# Patient Record
Sex: FEMALE | Race: WHITE | HISPANIC OR LATINO | Employment: FULL TIME | URBAN - METROPOLITAN AREA
[De-identification: names, ages, dates, MRNs, and addresses within clinical notes are randomized per-mention and may not be internally consistent; named-entity substitution may affect disease eponyms.]

---

## 2018-07-23 ENCOUNTER — OFFICE VISIT (OUTPATIENT)
Dept: URGENT CARE | Facility: CLINIC | Age: 33
End: 2018-07-23
Payer: COMMERCIAL

## 2018-07-23 VITALS
WEIGHT: 250 LBS | SYSTOLIC BLOOD PRESSURE: 112 MMHG | RESPIRATION RATE: 16 BRPM | TEMPERATURE: 97.9 F | BODY MASS INDEX: 42.68 KG/M2 | DIASTOLIC BLOOD PRESSURE: 80 MMHG | HEART RATE: 94 BPM | HEIGHT: 64 IN | OXYGEN SATURATION: 97 %

## 2018-07-23 DIAGNOSIS — G44.209 TENSION HEADACHE: Primary | ICD-10-CM

## 2018-07-23 PROCEDURE — 99213 OFFICE O/P EST LOW 20 MIN: CPT | Performed by: PHYSICIAN ASSISTANT

## 2018-07-23 NOTE — PATIENT INSTRUCTIONS
Acute Headache   AMBULATORY CARE:   An acute headache  is pain or discomfort that starts suddenly and gets worse quickly  You may have an acute headache only when you feel stress or eat certain foods  Other acute headache pain can happen every day, and sometimes several times a day  The cause of an acute headache may not be known  It may be triggered by stress, fatigue, hormones, food, or trauma  Common types of acute headache:   · Tension headache  is the most common type of headache  These headaches typically occur in the late afternoon and go away by evening  The pain is usually mild or moderate  You may have problems tolerating bright light or loud noise  The pain is usually across the forehead or in the back of the head, often only on one side  These headaches may occur every day  · Migraine headaches  cause moderate or severe pain  The headache generally lasts from 1 to 3 days and tends to come back  Pain is usually on only one side, but it may change sides  Migraines often occur in the temple, the back of the head, or behind the eye  The pain may throb or be sharp and steady  · A migraine with aura  means you see or feel something before a migraine  You may see a small spot surrounded by bright zigzag lines  Other signs or symptoms may follow the aura  · Cluster headache  pain is usually only on one side  It often causes severe pain, and can last for 30 minutes to 2 hours  These headaches may occur 1 or 2 times each day, more often at night  The pain may wake you  Seek care immediately if:   · You have severe pain  · You have numbness or weakness on one side of your face or body  · You have a headache that occurs after a blow to the head, a fall, or other trauma  · You have a headache, are forgetful or confused, or have trouble speaking  · You have a headache, stiff neck, and a fever  Contact your healthcare provider if:   · You have a constant headache and are vomiting      · You have a headache each day that does not get better, even after treatment  · You have changes in your headaches, or new symptoms that occur when you have a headache  · You have questions or concerns about your condition or care  Treatment:   · Medicine  may be given to decrease pain  The medicine your healthcare provider recommends will depend on the kind of headaches you have  You will need to take prescription headache medicines as directed to prevent a problem called rebound headache  These headaches happen with regular use of pain relievers for headache disorders  NSAIDs or acetaminophen may help some kinds of headaches  · Biofeedback  may help you learn how to change stress reactions  For example, you learn to slow your heart rate when you become upset  You may also learn to prevent certain headaches by combining heat with relaxation  · Cognitive behavior therapy,  or stress management, may be used with other therapies to prevent headaches  Manage your symptoms:   · Apply heat or ice  on the headache area  Use a heat or ice pack  For an ice pack, you can also put crushed ice in a plastic bag  Cover the pack or bag with a towel before you apply it to your skin  Ice and heat both help decrease pain, and heat helps decrease muscle spasms  Apply heat for 20 to 30 minutes every 2 hours  Apply ice for 15 to 20 minutes every hour  Apply heat or ice for as long and for as many days as directed  You may alternate heat and ice  · Relax your muscles  Lie down in a comfortable position and close your eyes  Relax your muscles slowly  Start at your toes and work your way up your body  · Keep a record of your headaches  Write down when your headaches start and stop  Include your symptoms and what you were doing when the headache began  Record what you ate or drank for 24 hours before the headache started  Describe the pain and where it hurts   Keep track of what you did to treat your headache and if it worked  Prevent an acute headache:   · Avoid anything that triggers an acute headache  Examples include exposure to chemicals, going to high altitude, or not getting enough sleep  Create a regular sleep routine  Go to sleep at the same time and wake up at the same time each day  Do not use electronic devices before bedtime  These may trigger a headache or prevent you from sleeping well  · Do not smoke  Nicotine and other chemicals in cigarettes and cigars can trigger an acute headache or make it worse  Ask your healthcare provider for information if you currently smoke and need help to quit  E-cigarettes or smokeless tobacco still contain nicotine  Talk to your healthcare provider before you use these products  · Limit alcohol as directed  Alcohol can trigger an acute headache or make it worse  If you have cluster headaches, do not drink alcohol during an episode  For other types of headaches, ask your healthcare provider if it is safe for you to drink alcohol  Ask how much is safe for you to drink, and how often  · Exercise as directed  Exercise can reduce tension and help with headache pain  Aim for 30 minutes of physical activity on most days of the week  Your healthcare provider can help you create an exercise plan  · Eat a variety of healthy foods  Healthy foods include fruits, vegetables, low-fat dairy products, lean meats, fish, whole grains, and cooked beans  Your healthcare provider or dietitian can help you create meals plans if you need to avoid foods that trigger headaches  Follow up with your healthcare provider as directed:  Bring your headache record with you when you see your healthcare provider  Write down your questions so you remember to ask them during your visits  © 2017 [employer] Information is for End User's use only and may not be sold, redistributed or otherwise used for commercial purposes   All illustrations and images included in CareNotes® are the copyrighted property of [employer]  or Francis Melgar  The above information is an  only  It is not intended as medical advice for individual conditions or treatments  Talk to your doctor, nurse or pharmacist before following any medical regimen to see if it is safe and effective for you

## 2018-07-23 NOTE — PROGRESS NOTES
[facility]        NAME: Timmy Schneider is a 26 y o  female  : 1985    MRN: [id number]  DATE: 2018  TIME: 8:49 AM    Assessment and Plan   Tension headache [G44 209]  1  Tension headache           Patient Instructions     Normal neuro exam and history clinically tension headache  Follow up with PCP in 3-5 days  Proceed to  ER if symptoms worsen  Chief Complaint     Chief Complaint   Patient presents with    Headache     x yesterday, was leaning over in the shower and felt a rush and had an instant headache, goes from the neck up, took advil and is on and off now         History of Present Illness         70-year-old female complains of headache and the back of her neck and around her head like a band for 2 days  She was leaning over shaving her legs in the shower and felt a rush of pain over her head  She denies dizziness or vomiting  No nausea or vision changes  No history of migraines or similar symptoms  She took Advil which did help her symptoms  Review of Systems   Review of Systems      Current Medications     No current outpatient prescriptions on file  Current Allergies     Allergies as of 2018    (No Known Allergies)            The following portions of the patient's history were reviewed and updated as appropriate: allergies, current medications, past family history, past medical history, past social history, past surgical history and problem list      Past Medical History:   Diagnosis Date    Von Willebrand disease ([facility] [address] )        Past Surgical History:   Procedure Laterality Date     SECTION      TONSILLECTOMY      WISDOM TOOTH EXTRACTION         Family History   Problem Relation Age of Onset    Hypertension Mother     Cancer Mother     Hypertension Father          Medications have been verified          Objective   /80 (BP Location: Right arm, Patient Position: Sitting, Cuff Size: Large)   Pulse 94   Temp 97 9 °F (36 6 °C) (Tympanic)   Resp 16   Ht 5' 4" (1 626 m)   Wt 113 kg (250 lb)   SpO2 97%   BMI 42 91 kg/m²        Physical Exam     Physical Exam   Constitutional: She is oriented to person, place, and time  She appears well-developed and well-nourished  No distress  HENT:   Right Ear: Tympanic membrane, external ear and ear canal normal    Left Ear: Tympanic membrane, external ear and ear canal normal    Nose: Nose normal  Right sinus exhibits no maxillary sinus tenderness and no frontal sinus tenderness  Left sinus exhibits no maxillary sinus tenderness and no frontal sinus tenderness  Mouth/Throat: Oropharynx is clear and moist  No posterior oropharyngeal erythema  Eyes: Conjunctivae and EOM are normal  Pupils are equal, round, and reactive to light  No scleral icterus  Neck: Normal range of motion  Neck supple  Cardiovascular: Normal rate, regular rhythm and normal heart sounds  Pulmonary/Chest: Effort normal and breath sounds normal  No respiratory distress  She has no wheezes  She has no rales  Abdominal: Soft  Bowel sounds are normal  She exhibits no distension and no mass  There is no tenderness  There is no rebound and no guarding  Musculoskeletal:     C-spine nontender full range of motion  Lymphadenopathy:     She has no cervical adenopathy  Neurological: She is alert and oriented to person, place, and time  No cranial nerve deficit  Coordination normal    Skin: Skin is warm and dry  No rash noted

## 2019-03-14 ENCOUNTER — OFFICE VISIT (OUTPATIENT)
Dept: URGENT CARE | Facility: CLINIC | Age: 34
End: 2019-03-14
Payer: COMMERCIAL

## 2019-03-14 VITALS
OXYGEN SATURATION: 97 % | SYSTOLIC BLOOD PRESSURE: 127 MMHG | HEART RATE: 97 BPM | RESPIRATION RATE: 18 BRPM | DIASTOLIC BLOOD PRESSURE: 78 MMHG | TEMPERATURE: 97.8 F | HEIGHT: 64 IN | WEIGHT: 248 LBS | BODY MASS INDEX: 42.34 KG/M2

## 2019-03-14 DIAGNOSIS — B96.89 ACUTE BACTERIAL BRONCHITIS: ICD-10-CM

## 2019-03-14 DIAGNOSIS — J20.8 ACUTE BACTERIAL BRONCHITIS: ICD-10-CM

## 2019-03-14 DIAGNOSIS — J06.9 ACUTE URI: Primary | ICD-10-CM

## 2019-03-14 PROCEDURE — S9083 URGENT CARE CENTER GLOBAL: HCPCS | Performed by: PHYSICIAN ASSISTANT

## 2019-03-14 PROCEDURE — G0382 LEV 3 HOSP TYPE B ED VISIT: HCPCS | Performed by: PHYSICIAN ASSISTANT

## 2019-03-14 RX ORDER — FLUTICASONE PROPIONATE 50 MCG
1 SPRAY, SUSPENSION (ML) NASAL DAILY
Qty: 1 BOTTLE | Refills: 0 | Status: SHIPPED | OUTPATIENT
Start: 2019-03-14

## 2019-03-14 RX ORDER — AZITHROMYCIN 250 MG/1
TABLET, FILM COATED ORAL
Qty: 6 TABLET | Refills: 0 | Status: SHIPPED | OUTPATIENT
Start: 2019-03-14 | End: 2019-03-18

## 2019-03-14 NOTE — PATIENT INSTRUCTIONS
Use flonase daily for sinus congestion  Fill prescription for antibiotic in 2-3 days if symptoms not improving  Take with food  Increase fluid intake  Watch for fevers  Follow up with your PCP For persistent symptoms  Go to the ER for any distress

## 2019-03-14 NOTE — PROGRESS NOTES
[facility]        NAME: Dipak Felix is a 26 y o  female  : 1985    MRN: [id number]  DATE: 2019  TIME: 9:44 AM    Assessment and Plan   Acute URI [J06 9]  1  Acute URI  fluticasone (FLONASE) 50 mcg/act nasal spray   2  Acute bacterial bronchitis  azithromycin (ZITHROMAX) 250 mg tablet         Patient Instructions     Use flonase daily for sinus congestion  Fill prescription for antibiotic in 2-3 days if symptoms not improving  Take with food  Increase fluid intake  Watch for fevers  Follow up with PCP in 3-5 days  Proceed to  ER if symptoms worsen  Chief Complaint     Chief Complaint   Patient presents with    Cough     x2 days , producing brown mucous   Nasal Congestion         History of Present Illness       This is a 75-year-old female presenting for URI symptoms 3-4 days  She reports sinus congestion, right lower, facial pressure, sore throat, cough  She reports that over the last 2 days her cough has worsened with increased mucus production and thick brown mucus  She denies any shortness of breath  She states that she did have some pleuritic pain on the left posterior aspect of her lungs when coughing yesterday but that is resolved today  She denies any fevers, chills, shortness of breath, difficulty breathing  No history of asthma or COPD  She reports history of walking pneumonia on multiple occasions, which started just like this  She has not been using any medications over the counter for her symptoms  Review of Systems   Review of Systems   Constitutional: Negative for chills, fatigue and fever  HENT: Positive for congestion, postnasal drip, rhinorrhea, sinus pressure and sore throat  Negative for ear pain  Respiratory: Positive for cough and chest tightness  Negative for shortness of breath and wheezing  Gastrointestinal: Negative for abdominal pain, diarrhea, nausea and vomiting  Musculoskeletal: Negative for myalgias     Skin: Negative for rash  Neurological: Negative for dizziness, light-headedness and headaches  Current Medications       Current Outpatient Medications:     azithromycin (ZITHROMAX) 250 mg tablet, Take 2 tablets today then 1 tablet daily x 4 days, Disp: 6 tablet, Rfl: 0    fluticasone (FLONASE) 50 mcg/act nasal spray, 1 spray into each nostril daily, Disp: 1 Bottle, Rfl: 0    Current Allergies     Allergies as of 2019    (No Known Allergies)            The following portions of the patient's history were reviewed and updated as appropriate: allergies, current medications, past family history, past medical history, past social history, past surgical history and problem list      Past Medical History:   Diagnosis Date    Von Willebrand disease ([facility] [address] )        Past Surgical History:   Procedure Laterality Date     SECTION      TONSILLECTOMY      WISDOM TOOTH EXTRACTION         Family History   Problem Relation Age of Onset    Hypertension Mother     Cancer Mother     Hypertension Father          Medications have been verified  Objective   /78   Pulse 97   Temp 97 8 °F (36 6 °C) (Temporal)   Resp 18   Ht 5' 4" (1 626 m)   Wt 112 kg (248 lb)   SpO2 97%   BMI 42 57 kg/m²        Physical Exam     Physical Exam   Constitutional: She appears well-developed and well-nourished  No distress  HENT:   Right Ear: Tympanic membrane, external ear and ear canal normal    Left Ear: Tympanic membrane, external ear and ear canal normal    Nose: Mucosal edema and rhinorrhea present  Mouth/Throat: Uvula is midline and mucous membranes are normal  Posterior oropharyngeal erythema (Mild) present  No oropharyngeal exudate or posterior oropharyngeal edema  Eyes: Conjunctivae are normal    Neck: Normal range of motion  Neck supple  Cardiovascular: Normal rate, regular rhythm and normal heart sounds  Pulmonary/Chest: Effort normal and breath sounds normal  No stridor  No respiratory distress   She has no decreased breath sounds  She has no wheezes  She has no rales  Lymphadenopathy:     She has no cervical adenopathy  Neurological: She is alert  Skin: Skin is warm and dry  She is not diaphoretic  Nursing note and vitals reviewed

## 2019-03-14 NOTE — LETTER
March 14, 2019     Patient: Jose Hendrickson   YOB: 1985   Date of Visit: 3/14/2019       To Whom it May Concern:    Jose Hendrickson was seen in my clinic on 3/14/2019  She may return to work on 3/15/2019  If you have any questions or concerns, please don't hesitate to call           Sincerely,          Daryn Loaiza PA-C        CC: No Recipients

## 2021-01-08 ENCOUNTER — IMMUNIZATIONS (OUTPATIENT)
Dept: FAMILY MEDICINE CLINIC | Facility: HOSPITAL | Age: 36
End: 2021-01-08

## 2021-01-08 DIAGNOSIS — Z23 ENCOUNTER FOR IMMUNIZATION: ICD-10-CM

## 2021-01-08 PROCEDURE — 0011A SARS-COV-2 / COVID-19 MRNA VACCINE (MODERNA) 100 MCG: CPT

## 2021-01-08 PROCEDURE — 91301 SARS-COV-2 / COVID-19 MRNA VACCINE (MODERNA) 100 MCG: CPT

## 2021-02-05 ENCOUNTER — IMMUNIZATIONS (OUTPATIENT)
Dept: FAMILY MEDICINE CLINIC | Facility: HOSPITAL | Age: 36
End: 2021-02-05

## 2021-02-05 DIAGNOSIS — Z23 ENCOUNTER FOR IMMUNIZATION: Primary | ICD-10-CM

## 2021-02-05 PROCEDURE — 0012A SARS-COV-2 / COVID-19 MRNA VACCINE (MODERNA) 100 MCG: CPT

## 2021-02-05 PROCEDURE — 91301 SARS-COV-2 / COVID-19 MRNA VACCINE (MODERNA) 100 MCG: CPT

## 2022-06-13 ENCOUNTER — OFFICE VISIT (OUTPATIENT)
Dept: OBGYN CLINIC | Facility: CLINIC | Age: 37
End: 2022-06-13
Payer: COMMERCIAL

## 2022-06-13 VITALS — BODY MASS INDEX: 44.05 KG/M2 | HEIGHT: 64 IN | WEIGHT: 258 LBS

## 2022-06-13 DIAGNOSIS — Z80.3 FAMILY HISTORY OF BREAST CANCER: ICD-10-CM

## 2022-06-13 DIAGNOSIS — Z12.4 ENCOUNTER FOR SCREENING FOR MALIGNANT NEOPLASM OF CERVIX: ICD-10-CM

## 2022-06-13 DIAGNOSIS — Z11.51 SCREENING FOR HPV (HUMAN PAPILLOMAVIRUS): ICD-10-CM

## 2022-06-13 DIAGNOSIS — Z31.89 ENCOUNTER FOR FERTILITY PLANNING: ICD-10-CM

## 2022-06-13 DIAGNOSIS — D68.0 VON WILLEBRANDS DISEASE (HCC): ICD-10-CM

## 2022-06-13 DIAGNOSIS — Z12.31 ENCOUNTER FOR SCREENING MAMMOGRAM FOR MALIGNANT NEOPLASM OF BREAST: ICD-10-CM

## 2022-06-13 DIAGNOSIS — Z01.419 WELL FEMALE EXAM WITH ROUTINE GYNECOLOGICAL EXAM: Primary | ICD-10-CM

## 2022-06-13 PROBLEM — F41.0 PANIC DISORDER: Status: ACTIVE | Noted: 2020-05-18

## 2022-06-13 PROBLEM — F41.1 GENERALIZED ANXIETY DISORDER: Status: ACTIVE | Noted: 2020-05-18

## 2022-06-13 PROCEDURE — G0476 HPV COMBO ASSAY CA SCREEN: HCPCS | Performed by: OBSTETRICS & GYNECOLOGY

## 2022-06-13 PROCEDURE — S0610 ANNUAL GYNECOLOGICAL EXAMINA: HCPCS | Performed by: OBSTETRICS & GYNECOLOGY

## 2022-06-13 PROCEDURE — G0145 SCR C/V CYTO,THINLAYER,RESCR: HCPCS | Performed by: OBSTETRICS & GYNECOLOGY

## 2022-06-13 RX ORDER — HYDROXYZINE HYDROCHLORIDE 25 MG/1
TABLET, FILM COATED ORAL
COMMUNITY
Start: 2022-02-01

## 2022-06-13 RX ORDER — PNV 119/IRON FUM/FOLIC ACID 29 MG-1 MG
TABLET ORAL DAILY
COMMUNITY

## 2022-06-13 NOTE — PROGRESS NOTES
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[address] )    Encounter for fertility planning    - information given for Dr Piyush Holt, Dr Francisco Louise    Other orders  -     hydrOXYzine HCL (ATARAX) 25 mg tablet; 1-2 po q 6 hrs prn anxiety  -     Prenatal Vit-DSS-Fe Fum-FA (Prenatal 19) tablet; Take by mouth daily          The following were reviewed in today's visit: ASCCP guidelines, Gardisil vaccination, STD testing breast self exam, mammography screening ordered, family planning choices, exercise and healthy diet  Patient to return to office in yearly for annual exam      All questions have been answered to her satisfaction  CC:  Annual Gynecologic Examination  Chief Complaint   Patient presents with    Gynecologic Exam     Pt is here for an annual exam and pap smear  Pt did have a miscarriage late last year and is trying to conceive again now   Establish Care       HPI: Jay Valiente is a 42 y o  [id number] who presents for annual gynecologic examination  She has the following concerns:  Trying to get pregnant, trying since November  She had miscarriage in November  Periods have been regular         Health Maintenance:    Exercise: occasionally  Breast exams/breast awareness: yes  Diet: balanced      Past Medical History:   Diagnosis Date    Von Willebrand disease ([address] )        Past Surgical History:   Procedure Laterality Date     SECTION      TONSILLECTOMY      WISDOM TOOTH EXTRACTION Past OB/Gyn History:  Period Cycle (Days): 30  Period Duration (Days): 5-6  Period Pattern: Regular  Menstrual Flow: Moderate, Heavy  Dysmenorrhea: (!) Moderate  Dysmenorrhea Symptoms: CrampingPatient's last menstrual period was 06/01/2022  Last Pap: 2017 : no abnormalities  History of abnormal Pap smear: yes, in early 25s  HPV vaccine completed: no    Patient is currently sexually active     STD testing: no  Current contraception: none      Family History  Family History   Problem Relation Age of Onset    Hypertension Mother     Cancer Mother     Hypertension Father     Asthma Brother     Asthma Daughter        Family history of uterine or ovarian cancer: no  Family history of breast cancer: yes  Family history of colon cancer: no    Social History:  Social History     Socioeconomic History    Marital status: /Civil Union     Spouse name: Not on file    Number of children: Not on file    Years of education: Not on file    Highest education level: Not on file   Occupational History    Not on file   Tobacco Use    Smoking status: Current Every Day Smoker     Packs/day: 0 25     Types: Cigarettes    Smokeless tobacco: Never Used   Vaping Use    Vaping Use: Never used   Substance and Sexual Activity    Alcohol use: Not Currently    Drug use: Never    Sexual activity: Yes     Partners: Male     Birth control/protection: None   Other Topics Concern    Not on file   Social History Narrative    Not on file     Social Determinants of Health     Financial Resource Strain: Not on file   Food Insecurity: Not on file   Transportation Needs: Not on file   Physical Activity: Not on file   Stress: Not on file   Social Connections: Not on file   Intimate Partner Violence: Not on file   Housing Stability: Not on file     Domestic violence screen: negative    Allergies:  No Known Allergies    Medications:    Current Outpatient Medications:     fluticasone (FLONASE) 50 mcg/act nasal spray, 1 spray into each nostril daily, Disp: 1 Bottle, Rfl: 0    hydrOXYzine HCL (ATARAX) 25 mg tablet, 1-2 po q 6 hrs prn anxiety, Disp: , Rfl:     Prenatal Vit-DSS-Fe Fum-FA (Prenatal 19) tablet, Take by mouth daily, Disp: , Rfl:     Review of Systems:  Review of Systems   Constitutional: Negative for chills and fever  Respiratory: Negative for cough and shortness of breath  Cardiovascular: Negative for chest pain and palpitations  Gastrointestinal: Positive for abdominal distention  Negative for abdominal pain, blood in stool, constipation, nausea and vomiting  Genitourinary: Negative for difficulty urinating, dyspareunia, dysuria, frequency, menstrual problem, pelvic pain, urgency, vaginal bleeding, vaginal discharge and vaginal pain  Neurological: Negative for headaches  Physical Exam:  Ht 5' 4" (1 626 m)   Wt 117 kg (258 lb)   LMP 06/01/2022   BMI 44 29 kg/m²    Physical Exam  Constitutional:       General: She is awake  Appearance: Normal appearance  She is well-developed  Genitourinary:      Vulva, bladder and urethral meatus normal       Right Labia: No rash, tenderness or lesions  Left Labia: No tenderness, lesions or rash  No labial fusion noted  No vaginal discharge, erythema, tenderness or bleeding  No vaginal prolapse present  No vaginal atrophy present  Right Adnexa: not tender, not full and no mass present  Left Adnexa: not tender, not full and no mass present  No cervical motion tenderness, discharge, lesion or polyp  Uterus is not enlarged, tender or irregular  No uterine mass detected  No urethral prolapse present  Bladder is not tender  Pelvic exam was performed with patient in the lithotomy position  Breasts:      Right: No inverted nipple, mass, nipple discharge, skin change, tenderness, axillary adenopathy or supraclavicular adenopathy        Left: No inverted nipple, mass, nipple discharge, skin change, tenderness, axillary adenopathy or supraclavicular adenopathy  HENT:      Head: Normocephalic and atraumatic  Cardiovascular:      Rate and Rhythm: Normal rate and regular rhythm  Heart sounds: Normal heart sounds  Pulmonary:      Effort: Pulmonary effort is normal  No tachypnea or respiratory distress  Breath sounds: Normal breath sounds  Abdominal:      General: Abdomen is flat  There is no distension  Palpations: Abdomen is soft  Tenderness: There is no abdominal tenderness  There is no guarding or rebound  Musculoskeletal:      Cervical back: Neck supple  Lymphadenopathy:      Upper Body:      Right upper body: No supraclavicular or axillary adenopathy  Left upper body: No supraclavicular or axillary adenopathy  Neurological:      General: No focal deficit present  Mental Status: She is alert and oriented to person, place, and time  Psychiatric:         Mood and Affect: Mood normal          Behavior: Behavior normal          Thought Content: Thought content normal          Judgment: Judgment normal    Vitals reviewed

## 2022-06-13 NOTE — PATIENT INSTRUCTIONS
Dr Barbara Love, [address] [facility] [address]  [phone] or [phone]    [employer]    Dr José Rivera

## 2022-06-14 LAB
HPV HR 12 DNA CVX QL NAA+PROBE: NEGATIVE
HPV16 DNA CVX QL NAA+PROBE: NEGATIVE
HPV18 DNA CVX QL NAA+PROBE: NEGATIVE

## 2022-06-15 NOTE — RESULT ENCOUNTER NOTE
Hello Ulysses Rosette,     Your HPV testing is negative  Your pap is still pending, and will be updated soon  Please contact the office at [phone] with any questions       Monica

## 2022-06-19 LAB
LAB AP GYN PRIMARY INTERPRETATION: NORMAL
Lab: NORMAL

## 2022-09-26 ENCOUNTER — OFFICE VISIT (OUTPATIENT)
Dept: OBGYN CLINIC | Facility: CLINIC | Age: 37
End: 2022-09-26
Payer: COMMERCIAL

## 2022-09-26 VITALS
BODY MASS INDEX: 42.85 KG/M2 | HEIGHT: 64 IN | DIASTOLIC BLOOD PRESSURE: 100 MMHG | SYSTOLIC BLOOD PRESSURE: 158 MMHG | WEIGHT: 251 LBS

## 2022-09-26 DIAGNOSIS — N91.2 AMENORRHEA: Primary | ICD-10-CM

## 2022-09-26 LAB — SL AMB POCT URINE HCG: POSITIVE

## 2022-09-26 PROCEDURE — 99214 OFFICE O/P EST MOD 30 MIN: CPT | Performed by: PHYSICIAN ASSISTANT

## 2022-09-26 PROCEDURE — 76817 TRANSVAGINAL US OBSTETRIC: CPT | Performed by: PHYSICIAN ASSISTANT

## 2022-09-26 PROCEDURE — 81025 URINE PREGNANCY TEST: CPT | Performed by: PHYSICIAN ASSISTANT

## 2022-09-26 RX ORDER — ALPRAZOLAM 0.5 MG/1
0.5 TABLET ORAL
COMMUNITY
Start: 2022-08-23 | End: 2022-09-26

## 2022-09-26 NOTE — PROGRESS NOTES
Assessment/Plan:  - Viable IUP @ 8w0d today  - TERRANCE 2023  - Continue PNV  - Call for concerns  - RTO 2 weeks for OB intake     Diagnoses and all orders for this visit:    Amenorrhea  -     POCT urine HCG  -     AMB US OB < 14 weeks single or first gestation level 1  -     Ambulatory Referral to Maternal Fetal Medicine; Future    Other orders  -     Discontinue: ALPRAZolam (XANAX) 0 5 mg tablet; Take 0 5 mg by mouth (Patient not taking: Reported on 2022)          Subjective:      Patient ID: Melba Rodriges is a 42 y o  female  Gm Wei is a 43YO  female presenting to the office for pregnancy confirmation via [address]  She reports her LMP as 22, placing her at [address] today with an TERRANCE of 23  She has a history of an early miscarriage and a 39 c/section in the past        The following portions of the patient's history were reviewed and updated as appropriate: allergies, current medications, past family history, past medical history, past social history, past surgical history and problem list     Review of Systems   Constitutional: Negative for chills, fever and unexpected weight change  Respiratory: Negative for shortness of breath  Cardiovascular: Negative for chest pain  Gastrointestinal: Negative for abdominal pain, diarrhea, nausea and vomiting  Skin: Negative for rash  Objective:      /100 (BP Location: Left arm, Patient Position: Sitting, Cuff Size: Large)   Ht 5' 4" (1 626 m)   Wt 114 kg (251 lb)   LMP 2022 (Exact Date)   Breastfeeding No   BMI 43 08 kg/m²          Physical Exam  Vitals reviewed  Constitutional:       Appearance: Normal appearance  She is normal weight  HENT:      Head: Normocephalic and atraumatic  Pulmonary:      Effort: Pulmonary effort is normal    Genitourinary:     General: Normal vulva  Labia:         Right: No rash or lesion  Left: No rash or lesion         Comments: TVUS reveals IUP, yolk sac, fetal pole, +CM  CRL 1 65 cm (8w0d)   bpm  TERRANCE 05-  Skin:     General: Skin is warm and dry  Neurological:      General: No focal deficit present  Mental Status: She is alert  Psychiatric:         Mood and Affect: Mood normal          Behavior: Behavior normal          Ultrasound Probe Disinfection    A transvaginal ultrasound was performed     Prior to use, disinfection was performed with High Level Disinfection Process (Trophon)  Probe serial number [id number] was used    Paula Reaves PA-C  09/26/22  9:47 AM

## 2022-09-30 ENCOUNTER — PATIENT MESSAGE (OUTPATIENT)
Dept: OBGYN CLINIC | Facility: CLINIC | Age: 37
End: 2022-09-30

## 2022-09-30 DIAGNOSIS — O99.211 OBESITY AFFECTING PREGNANCY IN FIRST TRIMESTER: Primary | ICD-10-CM

## 2022-09-30 DIAGNOSIS — O09.521 MULTIGRAVIDA OF ADVANCED MATERNAL AGE IN FIRST TRIMESTER: ICD-10-CM

## 2022-10-07 ENCOUNTER — LAB (OUTPATIENT)
Dept: LAB | Facility: CLINIC | Age: 37
End: 2022-10-07
Payer: COMMERCIAL

## 2022-10-07 DIAGNOSIS — O09.521 MULTIGRAVIDA OF ADVANCED MATERNAL AGE IN FIRST TRIMESTER: ICD-10-CM

## 2022-10-07 DIAGNOSIS — O99.211 OBESITY AFFECTING PREGNANCY IN FIRST TRIMESTER: ICD-10-CM

## 2022-10-07 LAB
ABO GROUP BLD: NORMAL
BASOPHILS # BLD AUTO: 0.05 THOUSANDS/ΜL (ref 0–0.1)
BASOPHILS NFR BLD AUTO: 0 % (ref 0–1)
BLD GP AB SCN SERPL QL: NEGATIVE
EOSINOPHIL # BLD AUTO: 0.16 THOUSAND/ΜL (ref 0–0.61)
EOSINOPHIL NFR BLD AUTO: 1 % (ref 0–6)
ERYTHROCYTE [DISTWIDTH] IN BLOOD BY AUTOMATED COUNT: 12.6 % (ref 11.6–15.1)
HCT VFR BLD AUTO: 44 % (ref 34.8–46.1)
HGB BLD-MCNC: 14.7 G/DL (ref 11.5–15.4)
IMM GRANULOCYTES # BLD AUTO: 0.05 THOUSAND/UL (ref 0–0.2)
IMM GRANULOCYTES NFR BLD AUTO: 0 % (ref 0–2)
LYMPHOCYTES # BLD AUTO: 2.29 THOUSANDS/ΜL (ref 0.6–4.47)
LYMPHOCYTES NFR BLD AUTO: 19 % (ref 14–44)
MCH RBC QN AUTO: 30.8 PG (ref 26.8–34.3)
MCHC RBC AUTO-ENTMCNC: 33.4 G/DL (ref 31.4–37.4)
MCV RBC AUTO: 92 FL (ref 82–98)
MONOCYTES # BLD AUTO: 0.73 THOUSAND/ΜL (ref 0.17–1.22)
MONOCYTES NFR BLD AUTO: 6 % (ref 4–12)
NEUTROPHILS # BLD AUTO: 9.07 THOUSANDS/ΜL (ref 1.85–7.62)
NEUTS SEG NFR BLD AUTO: 74 % (ref 43–75)
NRBC BLD AUTO-RTO: 0 /100 WBCS
PLATELET # BLD AUTO: 340 THOUSANDS/UL (ref 149–390)
PMV BLD AUTO: 9.3 FL (ref 8.9–12.7)
RBC # BLD AUTO: 4.77 MILLION/UL (ref 3.81–5.12)
RH BLD: NEGATIVE
SPECIMEN EXPIRATION DATE: NORMAL
VIT B12 SERPL-MCNC: 416 PG/ML (ref 100–900)
WBC # BLD AUTO: 12.35 THOUSAND/UL (ref 4.31–10.16)

## 2022-10-07 PROCEDURE — 86803 HEPATITIS C AB TEST: CPT

## 2022-10-07 PROCEDURE — 87086 URINE CULTURE/COLONY COUNT: CPT | Performed by: OBSTETRICS & GYNECOLOGY

## 2022-10-07 PROCEDURE — 86787 VARICELLA-ZOSTER ANTIBODY: CPT | Performed by: OBSTETRICS & GYNECOLOGY

## 2022-10-07 PROCEDURE — 82607 VITAMIN B-12: CPT

## 2022-10-07 PROCEDURE — 80081 OBSTETRIC PANEL INC HIV TSTG: CPT

## 2022-10-07 PROCEDURE — 36415 COLL VENOUS BLD VENIPUNCTURE: CPT | Performed by: OBSTETRICS & GYNECOLOGY

## 2022-10-08 LAB
BACTERIA UR CULT: NORMAL
HBV SURFACE AG SER QL: NORMAL
HCV AB SER QL: NORMAL
HIV 1+2 AB+HIV1 P24 AG SERPL QL IA: NORMAL
RUBV IGG SERPL IA-ACNC: 73 IU/ML
VZV IGG SER QL IA: NORMAL

## 2022-10-09 LAB — RPR SER QL: NORMAL

## 2022-10-10 ENCOUNTER — INITIAL PRENATAL (OUTPATIENT)
Dept: OBGYN CLINIC | Facility: CLINIC | Age: 37
End: 2022-10-10

## 2022-10-10 VITALS
DIASTOLIC BLOOD PRESSURE: 92 MMHG | HEIGHT: 64 IN | SYSTOLIC BLOOD PRESSURE: 140 MMHG | WEIGHT: 247.2 LBS | BODY MASS INDEX: 42.2 KG/M2

## 2022-10-10 DIAGNOSIS — R03.0 ELEVATED BP WITHOUT DIAGNOSIS OF HYPERTENSION: Primary | ICD-10-CM

## 2022-10-10 DIAGNOSIS — O99.211 OBESITY AFFECTING PREGNANCY IN FIRST TRIMESTER: ICD-10-CM

## 2022-10-10 DIAGNOSIS — Z3A.10 10 WEEKS GESTATION OF PREGNANCY: ICD-10-CM

## 2022-10-10 PROCEDURE — OBC

## 2022-10-10 NOTE — PROGRESS NOTES
OB INTAKE INTERVIEW  Pt presents for OB intake  Plan:  - Prenatal labs completed   - 1 hour glucose ordered   -Pre- E labs ordered  BP high at confirmation U/S 158/100 and today was 140/92 and 134/90  Patient to return on 10/17 for 1 week repeat BP check   - Referral given for MFM- NT scheduled for 11/3 and Level 2   - Reviewed Genetic testing options   -SMA and CF offered- patient unsure at this time   - Patient to call for concerns  - RTO 4 weeks for OB F/U visit and PAP/Cultures       OB History    Para Term  AB Living   3 1 1   1 1   SAB IAB Ectopic Multiple Live Births   1       1      # Outcome Date GA Lbr Terrence/2nd Weight Sex Delivery Anes PTL Lv   3 Current            2 SAB 2021           1 Term 16 41w0d  3799 g (8 lb 6 oz) F CS-Unspec Gen  DARLENE     Hx of  delivery prior to 36 weeks 6 days: No  Last Menstrual Period:    Patient's last menstrual period was 2022 (exact date)  Ultrasound date: 22 8 weeks 0 days     Estimated Date of Delivery: 23    Current Issues:  Constipation :   No  Headaches :   No  Cramping:  No  Spotting :   No    Interview education  • [facility] P[facility] Ess[facility] reviewed and discussed   • Baby and [address] Handout  • [facility] MFM Handout  • Discussed genetic testing  • Prenatal lab work: Scripts printed and given to pt  • Influenza vaccine given today: No  • Discussed Tdap vaccine     Immunizations:   Immunization History   Administered Date(s) Administered   • COVID-19 MODERNA VACC 0 5 ML IM 2021, 2021, 2021   • COVID-19, unspecified 2021, 2021   • INFLUENZA 2020   • Influenza Quadrivalent Preservative Free 3 years and older IM 2016, 09/15/2019, 09/15/2021   • Pneumococcal Polysaccharide PPV23 2019   • Tdap 2016, 2016       Prior Pregnancy Delivery Complications   History of  delivery or PPROM: No  History of Shoulder Dystocia: No   History of vacuum or forceps delivery: No   History of 3rd/4th degree laceration: No   History of  section: X1     Diabetes              Pregestational DM: No                hx of GDM: No              BMI >35: Yes              first degree relative with type 2 diabetes: No              hx of PCOS: No              current metformin use: No              prior hx of LGA/macrosomia: No                Hypertension              Hx of chronic HTN: No              hx of gestational HTN: No              hx of preeclampsia, eclampsia, or HELLP syndrome: No              Family h/o preeclampsia: No              Age 28 or older: Yes              Multifetal gestation: No  Type 1 or Type 2 DM: No  Renal Disease: No  Autoimmune disease (systemic lupus erythematosus, antiphospholipid antibody syndrome): No  Nulliparity: No  Obesity (BMI over 30): Yes  More than 10 year pregnancy interval: No  Previous IUGR, low birthweight or small for gestational age: No     Immunizations:              influenza vaccine: Receives through employer               discussed Tdap vaccine administration at 27-28 weeks   Covid Vaccination: Vaccinated with booster     Dental visit with last 6 months - Yes  PHQ-2/9 score: 0     MyChart activated (not 1518 years of age)?: Yes    The patient was oriented to our practice and all questions were answered    Interviewed by: Shital Charles 10/10/22

## 2022-10-22 ENCOUNTER — APPOINTMENT (OUTPATIENT)
Dept: LAB | Facility: CLINIC | Age: 37
End: 2022-10-22
Payer: COMMERCIAL

## 2022-10-22 DIAGNOSIS — Z3A.10 10 WEEKS GESTATION OF PREGNANCY: ICD-10-CM

## 2022-10-22 DIAGNOSIS — O99.211 OBESITY AFFECTING PREGNANCY IN FIRST TRIMESTER: ICD-10-CM

## 2022-10-22 DIAGNOSIS — R03.0 ELEVATED BP WITHOUT DIAGNOSIS OF HYPERTENSION: ICD-10-CM

## 2022-10-22 LAB
ALBUMIN SERPL BCP-MCNC: 3.2 G/DL (ref 3.5–5)
ALP SERPL-CCNC: 80 U/L (ref 46–116)
ALT SERPL W P-5'-P-CCNC: 53 U/L (ref 12–78)
ANION GAP SERPL CALCULATED.3IONS-SCNC: 5 MMOL/L (ref 4–13)
AST SERPL W P-5'-P-CCNC: 10 U/L (ref 5–45)
BILIRUB SERPL-MCNC: 0.18 MG/DL (ref 0.2–1)
BUN SERPL-MCNC: 5 MG/DL (ref 5–25)
CALCIUM ALBUM COR SERPL-MCNC: 10 MG/DL (ref 8.3–10.1)
CALCIUM SERPL-MCNC: 9.4 MG/DL (ref 8.3–10.1)
CHLORIDE SERPL-SCNC: 108 MMOL/L (ref 96–108)
CO2 SERPL-SCNC: 23 MMOL/L (ref 21–32)
CREAT SERPL-MCNC: 0.61 MG/DL (ref 0.6–1.3)
CREAT UR-MCNC: 21.4 MG/DL
GFR SERPL CREATININE-BSD FRML MDRD: 114 ML/MIN/1.73SQ M
GLUCOSE 1H P 50 G GLC PO SERPL-MCNC: 112 MG/DL (ref 40–134)
GLUCOSE SERPL-MCNC: 111 MG/DL (ref 65–140)
POTASSIUM SERPL-SCNC: 3.5 MMOL/L (ref 3.5–5.3)
PROT SERPL-MCNC: 7.2 G/DL (ref 6.4–8.4)
PROT UR-MCNC: <6 MG/DL
PROT/CREAT UR: <0.28 MG/G{CREAT} (ref 0–0.1)
SODIUM SERPL-SCNC: 136 MMOL/L (ref 135–147)
URATE SERPL-MCNC: 3.1 MG/DL (ref 2–7.5)

## 2022-10-22 PROCEDURE — 84550 ASSAY OF BLOOD/URIC ACID: CPT

## 2022-10-22 PROCEDURE — 84156 ASSAY OF PROTEIN URINE: CPT

## 2022-10-22 PROCEDURE — 82570 ASSAY OF URINE CREATININE: CPT

## 2022-10-22 PROCEDURE — 36415 COLL VENOUS BLD VENIPUNCTURE: CPT

## 2022-10-22 PROCEDURE — 80053 COMPREHEN METABOLIC PANEL: CPT

## 2022-10-22 PROCEDURE — 82950 GLUCOSE TEST: CPT

## 2022-11-01 ENCOUNTER — NURSE TRIAGE (OUTPATIENT)
Dept: OTHER | Facility: OTHER | Age: 37
End: 2022-11-01

## 2022-11-01 ENCOUNTER — TELEPHONE (OUTPATIENT)
Dept: OBGYN CLINIC | Facility: CLINIC | Age: 37
End: 2022-11-01

## 2022-11-01 DIAGNOSIS — Z3A.13 13 WEEKS GESTATION OF PREGNANCY: ICD-10-CM

## 2022-11-01 DIAGNOSIS — R00.2 PALPITATIONS: Primary | ICD-10-CM

## 2022-11-01 PROBLEM — O99.212 OBESITY AFFECTING PREGNANCY IN SECOND TRIMESTER: Status: ACTIVE | Noted: 2022-11-01

## 2022-11-01 PROBLEM — O09.522 MULTIGRAVIDA OF ADVANCED MATERNAL AGE IN SECOND TRIMESTER: Status: ACTIVE | Noted: 2022-11-01

## 2022-11-01 NOTE — TELEPHONE ENCOUNTER
Regarding: SOB/Racing heart  ----- Message from [facility] sent at 11/1/2022  7:17 AM EDT -----  "I feel like I have a racing heart  I am 13 weeks pregnant   I have a little shortness of breath "

## 2022-11-01 NOTE — TELEPHONE ENCOUNTER
Patient reports worsened palpitations and racing heart over the last couple of days  She would like a call back to advise  Reason for Disposition  • Problems with anxiety or stress    Answer Assessment - Initial Assessment Questions  1  DESCRIPTION: "Please describe your heart rate or heartbeat that you are having" (e g , fast/slow, regular/irregular, skipped or extra beats, "palpitations")      Fast (racing), palpitations  2  ONSET: "When did it start?" (Minutes, hours or days)       A couple of days ago, started weeks ago, worse the last few days  3  DURATION: "How long does it last" (e g , seconds, minutes, hours)      Varies, seconds-minutes   4  PATTERN "Does it come and go, or has it been constant since it started?"  "Does it get worse with exertion?"   "Are you feeling it now?"      Comes and goes   5  TAP: "Using your hand, can you tap out what you are feeling on a chair or table in front of you, so that I can hear?" (Note: not all patients can do this)        N/A  6  HEART RATE: "Can you tell me your heart rate?" "How many beats in 15 seconds?"  (Note: not all patients can do this)        101  7  RECURRENT SYMPTOM: "Have you ever had this before?" If Yes, ask: "When was the last time?" and "What happened that time?"       Yes, anxiety, feels different   8  CAUSE: "What do you think is causing the palpitations?"      Unaware  9  CARDIAC HISTORY: "Do you have any history of heart disease?" (e g , heart attack, angina, bypass surgery, angioplasty, arrhythmia)       Denies   10  OTHER SYMPTOMS: "Do you have any other symptoms?" (e g , dizziness, chest pain, sweating, difficulty breathing)        SOB at times (lasting seconds)   11   PREGNANCY: "Is there any chance you are pregnant?" "When was your last menstrual period?"        13 weeks    Protocols used: HEART RATE AND HEARTBEAT QUESTIONS-[facility]

## 2022-11-01 NOTE — TELEPHONE ENCOUNTER
Pt  C/o palpitations starting in the last few weeks  Woke up this morning with feeling of heart racing  Declines recent illness, states she drinks about 100 oz of water daily, no recent vomiting  Declines chest pain, SOB, dizziness  Pt  Is concerned as this began within last week

## 2022-11-01 NOTE — TELEPHONE ENCOUNTER
F/u call from health call this AM     Pt  C/o palpitations starting  More noticeably in the last week  Woke up this morning with feeling of heart racing  This also happened this past weekend  Declines recent illness, states she drinks about 100 oz of water daily, no recent vomiting  Declines chest pain, SOB, dizziness  Pt  Is concerned as this began within last week       Routing to provider for recs

## 2022-11-01 NOTE — TELEPHONE ENCOUNTER
I ordered her a TSH and a referral to cardiology  Please have her get labs done ASAP and call today for her appt with them

## 2022-11-02 ENCOUNTER — CONSULT (OUTPATIENT)
Dept: CARDIOLOGY CLINIC | Facility: CLINIC | Age: 37
End: 2022-11-02

## 2022-11-02 ENCOUNTER — APPOINTMENT (OUTPATIENT)
Dept: LAB | Facility: CLINIC | Age: 37
End: 2022-11-02

## 2022-11-02 VITALS
HEIGHT: 64 IN | HEART RATE: 107 BPM | WEIGHT: 244 LBS | OXYGEN SATURATION: 100 % | RESPIRATION RATE: 16 BRPM | SYSTOLIC BLOOD PRESSURE: 144 MMHG | BODY MASS INDEX: 41.66 KG/M2 | DIASTOLIC BLOOD PRESSURE: 90 MMHG

## 2022-11-02 DIAGNOSIS — R00.2 PALPITATIONS: Primary | ICD-10-CM

## 2022-11-02 DIAGNOSIS — R00.2 PALPITATIONS: ICD-10-CM

## 2022-11-02 DIAGNOSIS — Z3A.13 13 WEEKS GESTATION OF PREGNANCY: ICD-10-CM

## 2022-11-02 LAB — TSH SERPL DL<=0.05 MIU/L-ACNC: 0.96 UIU/ML (ref 0.45–4.5)

## 2022-11-02 NOTE — PROGRESS NOTES
[address] [facility]   Office Consultation    Elmer Sifuentes 42 y o  female MRN: [id number]    11/02/22          Assessment:  1  Palpitations   2  Hypertension   3  12w IUP  4  Anxiety   5  Tobacco abuse    Plan:  · Will evaluate with a TTE and 1 week event monitor  · Ambulatory blood pressure monitoring was strongly advised  · Will consider starting labetalol if she remains hypertensive and/or has increased palpitations  · TSH pending  · Smoking cessation advised  Follow up: 2 months or sooner as needed    1  Palpitations  Ambulatory Referral to Cardiology    Echo complete w/ contrast if indicated   2  13 weeks gestation of pregnancy  Ambulatory Referral to Cardiology    Echo complete w/ contrast if indicated       HPI: Elmer Sifuentes is a 42y o  year old female with history of anxiety and 13w IUP was referred by her OB/GYN Dr Sophie Grace to establish care  She denies past cardiac history  She is currently 13w pregnant  Over the past 3 weeks she has noted frequent palpitations  She notes intermittent irregular beats during the day  The episodes are brief and self-limited  She also notes episodes of palpitations that wakes her up from sleep about once per week  She has a history of anxiety but notes that these symptoms are different from her usual  She stays well hydrated and does not consume caffeine  She was mildly hypertensive on presentation today however notes adequate control on ambulatory monitoring  She walks daily and does yoga  She denies chest pain, dyspnea on exertion, edema or any other cardiac concerns at this time       ECG personally reviewed: NSR; normal ECG    Family History: mother with atrial fibrillation; father with PVCs    Social history: smokes 3 cigarettes per day; prior 1/2 ppd      No Known Allergies      Current Outpatient Medications:   •  hydrOXYzine HCL (ATARAX) 25 mg tablet, Take 25 mg by mouth every 6 (six) hours as needed, Disp: , Rfl:   •  MAGNESIUM PO, Take 1 tablet by mouth in the morning, Disp: , Rfl:   •  Prenatal Vit-DSS-Fe Fum-FA (Prenatal 19) tablet, Take by mouth daily, Disp: , Rfl:     Past Medical History:   Diagnosis Date   • Anxiety    • Herpes     hasn't had an outbreak in years   • Varicella     had chicken pox   • Von Willebrand disease        Family History   Problem Relation Age of Onset   • Breast cancer Mother    • Hypertension Mother    • Hypertension Father    • Asthma Brother    • Asthma Daughter        Past Surgical History:   Procedure Laterality Date   •  SECTION     • TONSILLECTOMY     • WISDOM TOOTH EXTRACTION         Social History     Socioeconomic History   • Marital status: /Civil Union     Spouse name: Not on file   • Number of children: Not on file   • Years of education: Not on file   • Highest education level: Not on file   Occupational History   • Not on file   Tobacco Use   • Smoking status: Current Every Day Smoker     Packs/day: 0 25     Types: Cigarettes   • Smokeless tobacco: Never Used   • Tobacco comment: 1 cigg a day   Vaping Use   • Vaping Use: Never used   Substance and Sexual Activity   • Alcohol use: Not Currently   • Drug use: Never   • Sexual activity: Yes     Partners: Male     Birth control/protection: None   Other Topics Concern   • Not on file   Social History Narrative   • Not on file     Social Determinants of Health     Financial Resource Strain: Not on file   Food Insecurity: Not on file   Transportation Needs: Not on file   Physical Activity: Not on file   Stress: Not on file   Social Connections: Not on file   Intimate Partner Violence: Not on file   Housing Stability: Not on file       Review of Systems   Constitutional: Negative for diaphoresis, weight gain and weight loss  HENT: Negative for congestion  Cardiovascular: Positive for irregular heartbeat and palpitations   Negative for chest pain, dyspnea on exertion, leg swelling, near-syncope, orthopnea, paroxysmal nocturnal dyspnea and syncope  Respiratory: Negative for shortness of breath, sleep disturbances due to breathing and snoring  Hematologic/Lymphatic: Does not bruise/bleed easily  Skin: Negative for rash  Musculoskeletal: Negative for myalgias  Gastrointestinal: Negative for nausea and vomiting  Neurological: Negative for excessive daytime sleepiness and light-headedness  Psychiatric/Behavioral: The patient is not nervous/anxious  Vitals: /90 (BP Location: Left arm, Patient Position: Sitting)   Pulse (!) 107   Resp 16   Ht 5' 4" (1 626 m)   Wt 111 kg (244 lb)   LMP 07/31/2022 (Exact Date)   SpO2 100%   BMI 41 88 kg/m²       Physical Exam:     GEN: Alert and oriented x 3, in no acute distress  Well appearing and well nourished  HEENT: Sclera anicteric, conjunctivae pink, mucous membranes moist  Oropharynx clear  NECK: Supple, no carotid bruits, no significant JVD  Trachea midline, no thyromegaly  HEART: Regular rhythm, normal S1 and S2, no murmurs, clicks, gallops or rubs  PMI nondisplaced, no thrills  LUNGS: Clear to auscultation bilaterally; no wheezes, rales, or rhonchi  No increased work of breathing or signs of respiratory distress  ABDOMEN: Soft, nontender, nondistended, normoactive bowel sounds  EXTREMITIES: Skin warm and well perfused, no clubbing, cyanosis, or edema  NEURO: No focal findings  Normal speech  Mood and affect normal    SKIN: Normal without suspicious lesions on exposed skin

## 2022-11-03 ENCOUNTER — ROUTINE PRENATAL (OUTPATIENT)
Dept: PERINATAL CARE | Facility: OTHER | Age: 37
End: 2022-11-03

## 2022-11-03 ENCOUNTER — APPOINTMENT (OUTPATIENT)
Dept: LAB | Facility: CLINIC | Age: 37
End: 2022-11-03

## 2022-11-03 VITALS
SYSTOLIC BLOOD PRESSURE: 134 MMHG | HEIGHT: 64 IN | DIASTOLIC BLOOD PRESSURE: 80 MMHG | BODY MASS INDEX: 41.93 KG/M2 | HEART RATE: 85 BPM | WEIGHT: 245.59 LBS

## 2022-11-03 DIAGNOSIS — N91.2 AMENORRHEA: ICD-10-CM

## 2022-11-03 DIAGNOSIS — D68.00 VON WILLEBRANDS DISEASE: Primary | ICD-10-CM

## 2022-11-03 DIAGNOSIS — Z36.82 ENCOUNTER FOR (NT) NUCHAL TRANSLUCENCY SCAN: ICD-10-CM

## 2022-11-03 DIAGNOSIS — O99.212 OBESITY AFFECTING PREGNANCY IN SECOND TRIMESTER: ICD-10-CM

## 2022-11-03 DIAGNOSIS — Z33.1 PREGNANT STATE, INCIDENTAL: ICD-10-CM

## 2022-11-03 DIAGNOSIS — Z3A.13 13 WEEKS GESTATION OF PREGNANCY: ICD-10-CM

## 2022-11-03 DIAGNOSIS — O09.522 MULTIGRAVIDA OF ADVANCED MATERNAL AGE IN SECOND TRIMESTER: ICD-10-CM

## 2022-11-03 NOTE — LETTER
November 3, 2022     [address]  [address] [facility]    Patient: Sergio Cardenas   YOB: 1985   Date of Visit: 11/3/2022       Dear Dr [facility]: Thank you for referring Sergio Cardenas to me for evaluation  Below are my notes for this consultation  If you have questions, please do not hesitate to call me  I look forward to following your patient along with you           Sincerely,        Jaja Murray MD        CC: No Recipients

## 2022-11-03 NOTE — PROGRESS NOTES
[address]: Ms Jud Bradshaw was seen today for nuchal translucency ultrasound  See ultrasound report under "OB Procedures" tab  Review of Systems   Constitutional: Negative for chills, fever and unexpected weight change  HENT: Negative for congestion, dental problem, facial swelling and sore throat  Eyes: Negative for visual disturbance  Respiratory: Negative for cough and shortness of breath  Cardiovascular: Negative for chest pain and palpitations  Gastrointestinal: Negative for diarrhea and vomiting  Endocrine: Negative for polydipsia  Genitourinary: Negative for dysuria and vaginal bleeding  Musculoskeletal: Negative for back pain and joint swelling  Skin: Negative for rash and wound  Allergic/Immunologic: Negative for immunocompromised state  Neurological: Negative for seizures and headaches  Hematological: Does not bruise/bleed easily  Psychiatric/Behavioral: Negative for hallucinations and suicidal ideas  Physical Exam  Constitutional:       General: She is not in acute distress  Appearance: Normal appearance  She is not ill-appearing, toxic-appearing or diaphoretic  HENT:      Head: Normocephalic and atraumatic  Nose: No congestion or rhinorrhea  Eyes:      General: No scleral icterus  Right eye: No discharge  Left eye: No discharge  Extraocular Movements: Extraocular movements intact  Conjunctiva/sclera: Conjunctivae normal    Pulmonary:      Effort: Pulmonary effort is normal  No respiratory distress  Musculoskeletal:      Cervical back: Normal range of motion  Skin:     Coloration: Skin is not jaundiced or pale  Findings: No erythema, lesion or rash  Neurological:      General: No focal deficit present  Mental Status: She is alert and oriented to person, place, and time     Psychiatric:         Mood and Affect: Mood normal          Behavior: Behavior normal           Please don't hesitate to contact our office with any concerns or questions   -Paty Coles

## 2022-11-03 NOTE — PROGRESS NOTES
Patient chose to have [facility] Non-invasive Prenatal Screen with fetal sex  Patient given brochure and is aware [facility] will contact patients insurance and coordinate coverage  Patient made aware she will need to respond to text message or e-mail from [employer] within 2 business days or testing will be run through insurance  Patient informed text message will come from area code  "415"  Provided [employer] # [phone] and web site : [email or web address]  Blood collection tubes labeled with patient identifiers (name, date of birth)    printed [facility] lab order and test kit given to patient to take to [facility] lab for blood collection  Copy of lab order scanned to Epic media  Maternal Fetal Medicine will have results in approximately 7-10 business days and will call patient or notify via [address]  Patient aware viewing lab result online will reveal fetal sex If ordered  Patient verbalized understanding of all instructions and no questions at this time

## 2022-11-03 NOTE — PATIENT INSTRUCTIONS
Thank you for choosing us for your  care today  If you have any questions about your ultrasound or care, please do not hesitate to contact us or your primary obstetrician  Some general instructions for your pregnancy are:    Protect against coronavirus: get vaccinated - pregnant women are increased risk of severe COVID  Notify your primary care doctor if you have any symptoms  Exercise: Aim for 22 minutes per day (150 minutes per week) of regular exercise  Walking is great! Nutrition: aim for calcium-rich and iron-rich foods as well as healthy sources of protein  Learn about Preeclampsia: preeclampsia is a common, serious high blood pressure complication in pregnancy  A blood pressure of 072DKJY (systolic or top number) or 40BSSY (diastolic or bottom number) is not normal and needs evaluation by your doctor  Aspirin is sometimes prescribed in early pregnancy to prevent preeclampsia in women with risk factors - ask your obstetrician if you should be on this medication  If you smoke, try to reduce how many cigarettes you smoke or try to quit completely  Do not vape  Other warning signs to watch out for in pregnancy or postpartum: chest pain, obstructed breathing or shortness of breath, seizures, thoughts of hurting yourself or your baby, bleeding, a painful or swollen leg, fever, or headache (see [employer] POST-BIRTH Warning Signs campaign)  If these happen call 911  Itching is also not normal in pregnancy and if you experience this, especially over your hands and feet, potentially worse at night, notify your doctors

## 2022-11-04 ENCOUNTER — TELEPHONE (OUTPATIENT)
Dept: CARDIOLOGY CLINIC | Facility: CLINIC | Age: 37
End: 2022-11-04

## 2022-11-04 DIAGNOSIS — E87.6 HYPOKALEMIA: ICD-10-CM

## 2022-11-04 DIAGNOSIS — I47.29 NSVT (NONSUSTAINED VENTRICULAR TACHYCARDIA): Primary | ICD-10-CM

## 2022-11-04 LAB — MISCELLANEOUS LAB TEST RESULT: NORMAL

## 2022-11-04 RX ORDER — LABETALOL 100 MG/1
100 TABLET, FILM COATED ORAL 2 TIMES DAILY
Qty: 60 TABLET | Refills: 3 | Status: SHIPPED | OUTPATIENT
Start: 2022-11-04

## 2022-11-05 NOTE — TELEPHONE ENCOUNTER
I discussed her case with EP and MFM  Given hypertension and NSVT, will start labetalol 100mg BID  She was agreeable to start  Will check BMP to assess for hypokalemia  She is currently wearing the event monitor  Will continue to monitor for recurrent ventricular arrhythmia

## 2022-11-07 ENCOUNTER — LAB (OUTPATIENT)
Dept: LAB | Facility: CLINIC | Age: 37
End: 2022-11-07

## 2022-11-07 DIAGNOSIS — E87.6 HYPOKALEMIA: ICD-10-CM

## 2022-11-07 LAB
ANION GAP SERPL CALCULATED.3IONS-SCNC: 4 MMOL/L (ref 4–13)
BUN SERPL-MCNC: 4 MG/DL (ref 5–25)
CALCIUM SERPL-MCNC: 9.5 MG/DL (ref 8.3–10.1)
CHLORIDE SERPL-SCNC: 108 MMOL/L (ref 96–108)
CO2 SERPL-SCNC: 24 MMOL/L (ref 21–32)
CREAT SERPL-MCNC: 0.56 MG/DL (ref 0.6–1.3)
GFR SERPL CREATININE-BSD FRML MDRD: 117 ML/MIN/1.73SQ M
GLUCOSE SERPL-MCNC: 104 MG/DL (ref 65–140)
POTASSIUM SERPL-SCNC: 3.8 MMOL/L (ref 3.5–5.3)
SODIUM SERPL-SCNC: 136 MMOL/L (ref 135–147)

## 2022-11-08 ENCOUNTER — ROUTINE PRENATAL (OUTPATIENT)
Dept: OBGYN CLINIC | Facility: CLINIC | Age: 37
End: 2022-11-08

## 2022-11-08 VITALS — SYSTOLIC BLOOD PRESSURE: 132 MMHG | WEIGHT: 246 LBS | DIASTOLIC BLOOD PRESSURE: 82 MMHG | BODY MASS INDEX: 42.23 KG/M2

## 2022-11-08 DIAGNOSIS — D68.00 VON WILLEBRANDS DISEASE: ICD-10-CM

## 2022-11-08 DIAGNOSIS — Z67.91 RH NEGATIVE STATE IN ANTEPARTUM PERIOD, SECOND TRIMESTER: Primary | ICD-10-CM

## 2022-11-08 DIAGNOSIS — Z3A.14 14 WEEKS GESTATION OF PREGNANCY: ICD-10-CM

## 2022-11-08 DIAGNOSIS — O26.892 RH NEGATIVE STATE IN ANTEPARTUM PERIOD, SECOND TRIMESTER: Primary | ICD-10-CM

## 2022-11-08 NOTE — PROGRESS NOTES
Patient is a 41 YO  female presenting to the office at 14 2 weeks for routine OB care  BP: 03717  TWG: -14lb  Fetal Movement: some flutters  Feeling well today  Denies LOF, CTX, VB  CHTN and palpitations, saw cardiology, started on labetalol  PAP UTD, cultures collected  NT normal, NIPT low risk male, AFP ordered and patient aware of timing of test  Anatomy scan scheduled  OK to transfuse and code  VWD - needs labs @ 36 weeks, VWF and FVIII  Has heme consult scheduled  Reviewed precautions  Call for concerns  RTO 4 weeks

## 2022-11-09 LAB
C TRACH DNA SPEC QL NAA+PROBE: NEGATIVE
N GONORRHOEA DNA SPEC QL NAA+PROBE: NEGATIVE

## 2022-11-11 ENCOUNTER — CLINICAL SUPPORT (OUTPATIENT)
Dept: CARDIOLOGY CLINIC | Facility: CLINIC | Age: 37
End: 2022-11-11

## 2022-11-11 DIAGNOSIS — R00.2 PALPITATIONS: ICD-10-CM

## 2022-11-20 NOTE — RESULT ENCOUNTER NOTE
[facility]    Event Recorder Results    Indication:  Palpitations    Duration of monitoring: 3d 1h 28m    Results:   Predominant underlying rhythm:  Normal sinus rhythm  Average heart rate:  78 beats per minute    394 PACs with PAC burden of <1%  210 PVCs with PVC burden of <1%  There was one 6 beat run of monomorphic nonsustained ventricular tachycardia  Symptoms correlated with normal sinus rhythm with NSVT as well as PACs    Interpretation:  Patient was in normal sinus rhythm throughout the duration of the study  There was one 6 beat run of monomorphic NSVT

## 2022-11-20 NOTE — RESULT ENCOUNTER NOTE
Please let her know that there was no other significant arrhythmia noted on her event monitor except for what was already discussed

## 2022-12-05 ENCOUNTER — ROUTINE PRENATAL (OUTPATIENT)
Dept: OBGYN CLINIC | Facility: CLINIC | Age: 37
End: 2022-12-05

## 2022-12-05 VITALS — SYSTOLIC BLOOD PRESSURE: 130 MMHG | WEIGHT: 244.6 LBS | DIASTOLIC BLOOD PRESSURE: 78 MMHG | BODY MASS INDEX: 41.99 KG/M2

## 2022-12-05 DIAGNOSIS — Z3A.18 18 WEEKS GESTATION OF PREGNANCY: ICD-10-CM

## 2022-12-05 DIAGNOSIS — Z67.91 RH NEGATIVE STATE IN ANTEPARTUM PERIOD, SECOND TRIMESTER: Primary | ICD-10-CM

## 2022-12-05 DIAGNOSIS — O26.892 RH NEGATIVE STATE IN ANTEPARTUM PERIOD, SECOND TRIMESTER: Primary | ICD-10-CM

## 2022-12-05 NOTE — PROGRESS NOTES
Patient is a 41 YO  female presenting to the office at 18 1 weeks for routine OB care  BP: 130/78  TWG: -15lb  Fetal Movement: yes feeling movement  Feeling well today, much improved since last visit  Back to normal diet  Denies LOF, CTX, VB  Discussed rhogam at 29 weeks unless bleeding episode prior  Patient has MFM anatomy scan scheduled  Reviewed precautions  Call for concerns  RTO 4 weeks

## 2022-12-09 ENCOUNTER — HOSPITAL ENCOUNTER (EMERGENCY)
Facility: HOSPITAL | Age: 37
Discharge: HOME | End: 2022-12-09
Attending: EMERGENCY MEDICINE

## 2022-12-09 VITALS
RESPIRATION RATE: 18 BRPM | HEIGHT: 64 IN | SYSTOLIC BLOOD PRESSURE: 135 MMHG | TEMPERATURE: 98.2 F | OXYGEN SATURATION: 97 % | DIASTOLIC BLOOD PRESSURE: 60 MMHG | BODY MASS INDEX: 41.78 KG/M2 | HEART RATE: 67 BPM | WEIGHT: 244.71 LBS

## 2022-12-09 DIAGNOSIS — R42 DIZZINESS: Primary | ICD-10-CM

## 2022-12-09 LAB
ALBUMIN SERPL BCP-MCNC: 3.8 G/DL (ref 3.5–5)
ALP SERPL-CCNC: 80 U/L (ref 34–104)
ALT SERPL W P-5'-P-CCNC: 58 U/L (ref 7–52)
ANION GAP SERPL CALCULATED.3IONS-SCNC: 7 MMOL/L (ref 4–13)
AST SERPL W P-5'-P-CCNC: 15 U/L (ref 13–39)
ATRIAL RATE: 80 BPM
B-HCG SERPL-ACNC: ABNORMAL MIU/ML (ref 0–11.6)
BACTERIA UR QL AUTO: ABNORMAL /HPF
BASOPHILS # BLD AUTO: 0.04 THOUSANDS/ÂΜL (ref 0–0.1)
BASOPHILS NFR BLD AUTO: 0 % (ref 0–1)
BILIRUB SERPL-MCNC: 0.32 MG/DL (ref 0.2–1)
BILIRUB UR QL STRIP: NEGATIVE
BUN SERPL-MCNC: 5 MG/DL (ref 5–25)
CALCIUM SERPL-MCNC: 9.3 MG/DL (ref 8.4–10.2)
CAOX CRY URNS QL MICRO: ABNORMAL /HPF
CARDIAC TROPONIN I PNL SERPL HS: 3 NG/L (ref 8–18)
CHLORIDE SERPL-SCNC: 108 MMOL/L (ref 96–108)
CLARITY UR: CLEAR
CO2 SERPL-SCNC: 23 MMOL/L (ref 21–32)
COLOR UR: COLORLESS
CREAT SERPL-MCNC: 0.45 MG/DL (ref 0.6–1.3)
EOSINOPHIL # BLD AUTO: 0.18 THOUSAND/ÂΜL (ref 0–0.61)
EOSINOPHIL NFR BLD AUTO: 2 % (ref 0–6)
ERYTHROCYTE [DISTWIDTH] IN BLOOD BY AUTOMATED COUNT: 13 % (ref 11.6–15.1)
GFR SERPL CREATININE-BSD FRML MDRD: 126 ML/MIN/1.73SQ M
GLUCOSE SERPL-MCNC: 100 MG/DL (ref 65–140)
GLUCOSE UR STRIP-MCNC: NEGATIVE MG/DL
HCT VFR BLD AUTO: 36.5 % (ref 34.8–46.1)
HGB BLD-MCNC: 12.4 G/DL (ref 11.5–15.4)
HGB UR QL STRIP.AUTO: NEGATIVE
IMM GRANULOCYTES # BLD AUTO: 0.07 THOUSAND/UL (ref 0–0.2)
IMM GRANULOCYTES NFR BLD AUTO: 1 % (ref 0–2)
KETONES UR STRIP-MCNC: NEGATIVE MG/DL
LEUKOCYTE ESTERASE UR QL STRIP: NEGATIVE
LYMPHOCYTES # BLD AUTO: 2.03 THOUSANDS/ÂΜL (ref 0.6–4.47)
LYMPHOCYTES NFR BLD AUTO: 19 % (ref 14–44)
MCH RBC QN AUTO: 32.2 PG (ref 26.8–34.3)
MCHC RBC AUTO-ENTMCNC: 34 G/DL (ref 31.4–37.4)
MCV RBC AUTO: 95 FL (ref 82–98)
MONOCYTES # BLD AUTO: 0.57 THOUSAND/ÂΜL (ref 0.17–1.22)
MONOCYTES NFR BLD AUTO: 5 % (ref 4–12)
NEUTROPHILS # BLD AUTO: 8.08 THOUSANDS/ÂΜL (ref 1.85–7.62)
NEUTS SEG NFR BLD AUTO: 73 % (ref 43–75)
NITRITE UR QL STRIP: NEGATIVE
NON-SQ EPI CELLS URNS QL MICRO: ABNORMAL /HPF
NRBC BLD AUTO-RTO: 0 /100 WBCS
P AXIS: 67 DEGREES
PH UR STRIP.AUTO: 6.5 [PH]
PLATELET # BLD AUTO: 279 THOUSANDS/UL (ref 149–390)
PMV BLD AUTO: 8.9 FL (ref 8.9–12.7)
POTASSIUM SERPL-SCNC: 4 MMOL/L (ref 3.5–5.3)
PR INTERVAL: 152 MS
PROT SERPL-MCNC: 6.6 G/DL (ref 6.4–8.4)
PROT UR STRIP-MCNC: NEGATIVE MG/DL
QRS AXIS: 67 DEGREES
QRSD INTERVAL: 84 MS
QT INTERVAL: 392 MS
QTC INTERVAL: 452 MS
RBC # BLD AUTO: 3.85 MILLION/UL (ref 3.81–5.12)
RBC #/AREA URNS AUTO: ABNORMAL /HPF
SODIUM SERPL-SCNC: 138 MMOL/L (ref 135–147)
SP GR UR STRIP.AUTO: 1 (ref 1–1.03)
T WAVE AXIS: 38 DEGREES
UROBILINOGEN UR STRIP-ACNC: <2 MG/DL
VENTRICULAR RATE: 80 BPM
WBC # BLD AUTO: 10.97 THOUSAND/UL (ref 4.31–10.16)
WBC #/AREA URNS AUTO: ABNORMAL /HPF

## 2022-12-09 RX ADMIN — SODIUM CHLORIDE 1000 ML: 0.9 INJECTION, SOLUTION INTRAVENOUS at 14:20

## 2022-12-09 NOTE — Clinical Note
Pedro Pablo Lerma was seen and treated in our emergency department on 12/9/2022  Diagnosis:     Carlos Turk  may return to school on return date  She may return on this date: 12/19/2022         If you have any questions or concerns, please don't hesitate to call        Bea Bermeo, DO    ______________________________           _______________          _______________  Hospital Representative                              Date                                Time

## 2022-12-13 ENCOUNTER — TELEPHONE (OUTPATIENT)
Dept: CARDIOLOGY CLINIC | Facility: CLINIC | Age: 37
End: 2022-12-13

## 2022-12-13 ENCOUNTER — HOSPITAL ENCOUNTER (OUTPATIENT)
Dept: NON INVASIVE DIAGNOSTICS | Facility: CLINIC | Age: 37
Discharge: HOME | End: 2022-12-13

## 2022-12-13 VITALS
HEART RATE: 80 BPM | DIASTOLIC BLOOD PRESSURE: 60 MMHG | WEIGHT: 244 LBS | SYSTOLIC BLOOD PRESSURE: 135 MMHG | BODY MASS INDEX: 41.66 KG/M2 | HEIGHT: 64 IN

## 2022-12-13 DIAGNOSIS — R00.2 PALPITATIONS: ICD-10-CM

## 2022-12-13 DIAGNOSIS — Z3A.13 13 WEEKS GESTATION OF PREGNANCY: ICD-10-CM

## 2022-12-13 LAB
AORTIC ROOT: 2.9 CM
APICAL FOUR CHAMBER EJECTION FRACTION: 66 %
ASCENDING AORTA: 2.7 CM
E WAVE DECELERATION TIME: 251 MS
FRACTIONAL SHORTENING: 36 % (ref 28–44)
INTERVENTRICULAR SEPTUM IN DIASTOLE (PARASTERNAL SHORT AXIS VIEW): 1.1 CM
INTERVENTRICULAR SEPTUM: 1.1 CM (ref 0.6–1.1)
LAAS-AP2: 18.7 CM2
LAAS-AP4: 15.6 CM2
LEFT ATRIUM SIZE: 3.9 CM
LEFT INTERNAL DIMENSION IN SYSTOLE: 3 CM (ref 2.1–4)
LEFT VENTRICULAR INTERNAL DIMENSION IN DIASTOLE: 4.7 CM (ref 3.5–6)
LEFT VENTRICULAR POSTERIOR WALL IN END DIASTOLE: 1.1 CM
LEFT VENTRICULAR STROKE VOLUME: 69 ML
LVSV (TEICH): 69 ML
MV E'TISSUE VEL-SEP: 11 CM/S
MV PEAK A VEL: 0.49 M/S
MV PEAK E VEL: 75 CM/S
RIGHT ATRIUM AREA SYSTOLE A4C: 11.2 CM2
SL CV LEFT ATRIUM LENGTH A2C: 4.6 CM
SL CV LV EF: 60
SL CV PED ECHO LEFT VENTRICLE DIASTOLIC VOLUME (MOD BIPLANE) 2D: 105 ML
SL CV PED ECHO LEFT VENTRICLE SYSTOLIC VOLUME (MOD BIPLANE) 2D: 35 ML
TR MAX PG: 26 MMHG
TR PEAK VELOCITY: 2.6 M/S
TRICUSPID ANNULAR PLANE SYSTOLIC EXCURSION: 3 CM
TRICUSPID VALVE PEAK REGURGITATION VELOCITY: 2.56 M/S

## 2022-12-13 NOTE — TELEPHONE ENCOUNTER
----- Message from Millie Santos MD sent at 12/13/2022 12:19 PM EST -----  Please let the patient know that there were no significant abnormalities on their echo  We can discuss further at their next appointment  Thanks

## 2022-12-15 ENCOUNTER — TELEPHONE (OUTPATIENT)
Dept: CARDIOLOGY CLINIC | Facility: CLINIC | Age: 37
End: 2022-12-15

## 2022-12-15 NOTE — TELEPHONE ENCOUNTER
----- Message from Adrian Agrawal sent at 12/15/2022 10:23 AM EST -----  Regarding: Anxiety   Contact: [phone]  My anxiety has seemed to increase with the Labetalol  I am seeing my PCP, and wanted to make sure it was safe to take Lexapro with the Labetalol

## 2022-12-23 ENCOUNTER — ROUTINE PRENATAL (OUTPATIENT)
Facility: HOSPITAL | Age: 37
End: 2022-12-23

## 2022-12-23 VITALS
BODY MASS INDEX: 40.74 KG/M2 | HEIGHT: 64 IN | WEIGHT: 238.6 LBS | SYSTOLIC BLOOD PRESSURE: 130 MMHG | HEART RATE: 86 BPM | DIASTOLIC BLOOD PRESSURE: 82 MMHG

## 2022-12-23 DIAGNOSIS — O10.912 MATERNAL CHRONIC HYPERTENSION, SECOND TRIMESTER: ICD-10-CM

## 2022-12-23 DIAGNOSIS — O44.42 LOW-LYING PLACENTA WITHOUT HEMORRHAGE, SECOND TRIMESTER: ICD-10-CM

## 2022-12-23 DIAGNOSIS — O99.212 MATERNAL MORBID OBESITY IN SECOND TRIMESTER, ANTEPARTUM (HCC): ICD-10-CM

## 2022-12-23 DIAGNOSIS — Z3A.20 20 WEEKS GESTATION OF PREGNANCY: ICD-10-CM

## 2022-12-23 DIAGNOSIS — E66.01 MATERNAL MORBID OBESITY IN SECOND TRIMESTER, ANTEPARTUM (HCC): ICD-10-CM

## 2022-12-23 DIAGNOSIS — Z36.86 ENCOUNTER FOR ANTENATAL SCREENING FOR CERVICAL LENGTH: ICD-10-CM

## 2022-12-23 DIAGNOSIS — O09.522 ELDERLY MULTIGRAVIDA, SECOND TRIMESTER: Primary | ICD-10-CM

## 2022-12-23 NOTE — PROGRESS NOTES
Ultrasound Probe Disinfection    A transvaginal ultrasound was performed  Prior to use, disinfection was performed with High Level Disinfection Process (Trophon)  Probe serial number A2: [id number] was used        Peter Jain  12/23/22  9:15 AM

## 2022-12-23 NOTE — LETTER
December 23, 2022     [address] [place] [address]  [address]  [facility]    Patient: Lyndsey Lucero   YOB: 1985   Date of Visit: 12/23/2022       Dear Dr Pompa Gain:    Thank you for referring Lyndsey Lucero to me for evaluation  Below are my notes for this consultation  If you have questions, please do not hesitate to call me  I look forward to following your patient along with you           Sincerely,        Quinton De Paz MD        CC: No Recipients  Quinton De Paz MD  12/22/2022  4:42 PM  Sign when Signing Visit  Please refer to the [facility] ultrasound report in Ob Procedures for additional information regarding today's visit

## 2023-01-02 NOTE — PROGRESS NOTES
HEMATOLOGY CLINIC NOTE    Primary Care Provider: No primary care provider on file  Referring Provider: Tre Cedillo  MRN: [id number]  : 1985    Assessment / Plan:   1  Von Willebrands disease  2  Second trimester pregnancy  This is a pleasant 75-year-old female with a history of von Willebrand's disease, likely type I mild  She was initially diagnosed approximately at age 19 when she had menorrhagia  Previously she had a  in 2016 where she received DDAVP afterwards and did well  She did not require other treatments afterwards  She is currently in her second trimester and is due May 8, 2023  However, she states she will likely be induced for  prior to this date  She did previously see a private practice hematologist in [place] as below in HPI  Otherwise, she has not been regularly following a hematologist     I discussed patient's case with my attending Dr Mj Peralta  We will obtain VWD comprehensive panel, VWF multimer testing labs now despite von Willebrand's factor, factor 8 likely be affected by pregnancy  I have also inform our office to call private practice office to obtain records  No need for challenging with DDAVP to ensure response as long as we can confirm in records she did respond in past  Will also follow repeat labs by OBGYN at 36 weeks  - Recommended treatment is DDAVP 150mcg in each nostril 2 hours prior to surgery  Afterwards, she will have twice weekly labs (CBC, VWF) for 6 weeks due to high risk period of postpartum hemorrhage  Recommend providing Tranexamic acid if bleeding occurred  - Ambulatory Referral to Hematology / Oncology  - VWF Multimeric Panel; Future  - Von Willebrand Comprehensive Panel; Future    · Discussion of decision making    I personally reviewed the following lab results, the image studies, pathology, other specialty/physicians consult notes and recommendations, and outside medical records from [facility]   I had a lengthy discussion with the patient and shared the work-up findings  I spent 45 minutes reviewing the records (labs, clinician notes, outside records, medical history, ordering medicine/tests/procedures, interpreting the imaging/labs previously done) and coordination of care as well as direct time with the patient today, of which greater than 50% of the time was spent in counseling and coordination of care with the patient/family  · Plan/Labs  · Repeat VWD comprehensive panel, multimeric panel analysis  · Will have office obtain records for private practice hematologist in [place]  Will confirm patient responded well to DDAVP through these records  If so, no need for challenging with DDAVP  · OBGYN to order Factor 8, VWF labs at 36 weeks prior to delivery  · Recommend DDAVP 150mcg in each nostril 2 hours prior to delivery  Afterwards, will have twice weekly labs for 6 weeks including CBC, VWF due to high risk period for postpartum hemorrhage  Recommend script for Tranexamic acid to use if bleeding occurs  Follow Up: encouraged patient to follow up with us at least once yearly  All questions were answered to the patient's satisfaction during this encounter  The patient knows the contact information for our office and knows to reach out for any relevant concerns related to this encounter  They are to call for any temperature 100 4 or higher, new symptoms including but not restricted to shaking chills, decreased appetite, nausea, vomiting, diarrhea, increased fatigue, shortness of breath or chest pain, confusion, and not feeling the strength to come to the clinic  For all other listed problems and medical diagnosis in their chart - they are managed by PCP and/or other specialists, which the patient acknowledges  Thank you very much for your consultation and making us a part of this patient's care  We are continuing to follow closely with you   Please do not hesitate to reach out to me with any additional questions or concerns  Reason for visit:       Chief Complaint   Patient presents with   • Consult       History of Hematology Illness:     Andrea Valencia is a 42 y o  female who came in for consultation  1  Von Willebrand Disease, likely Type 1 mild  - Patient previously followed private practice physician Dr Eugene Tanner in [address]  Has not followed in several years  - Patient was diagnosed when she was about 19years old after having irregular menstrual cycle, menorrhagia  Also, had frequent nose bleeds as a child  She does not have these records, they are lost  However, patient is confident it is mild form of type I    - at 6-9 years old, patient s/p tonsillectomy and did not require anything afterwards  - Had wisdom teeth removal several years ago and had both DDAVP + pills afterwards (likely Tranexamic acid pills)  - 2016, had first pregnancy  S/P   Only required DDAVP without tranexamic acid and did well per patient  - Patient is currently in second trimester  Due date for delivery is May 8th  However, per patient will likely be induced prior to this date  Recheck of VWF, factor 8 will be done at 36 weeks  Interval History:   23: This is a 42year old female with a PMH of RAYNE, VWD, HTN, Panic disorder, multigravida of advanced maternal age, obesity, Rh negative state and more presenting for consultation  Patient history as above  She has no current bleeding  No current concerns with pregnancy  She is not currently following a hematologist regularly  She does smoke and is currently at 1 cigarette a day  She is tapering off to eventually quit smoking  She does not drink  She is now not working and 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 for possibly a few years  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  She has never had cancer  Her mother has stage II breast cancer and is currently on treatment    Maternal grandmother had kidney cancer, bladder cancer  Maternal grandfather had prostate cancer  Father side unknown as he was adopted  Mammogram to be done after pregnancy  No breast complaints  Problem list:       Patient Active Problem List   Diagnosis   • Generalized anxiety disorder   • Panic disorder   • Von Willebrands disease   • Multigravida of advanced maternal age in second trimester   • Obesity affecting pregnancy in second trimester   • 13 weeks gestation of pregnancy   • Rh negative state in antepartum period, second trimester   • Low-lying placenta without hemorrhage, second trimester   • Maternal chronic hypertension, second trimester   • Maternal morbid obesity in second trimester, antepartum ([facility] [address] )   • Elderly multigravida, second trimester       REVIEW OF SYMPTOMS:   Review of Systems   Constitutional: Negative for activity change, appetite change and unexpected weight change  HENT: Negative for nosebleeds  Eyes: Negative for visual disturbance  Respiratory: Negative for cough and shortness of breath  Cardiovascular: Negative for chest pain, palpitations and leg swelling  Gastrointestinal: Negative for abdominal pain, anal bleeding, blood in stool, constipation, diarrhea, nausea and vomiting  Endocrine: Negative for cold intolerance  Genitourinary: Negative for hematuria, menstrual problem and vaginal bleeding  Musculoskeletal: Negative for arthralgias  Skin: Negative for color change, pallor and rash  Neurological: Negative for dizziness, syncope, light-headedness and headaches  Hematological: Bruises/bleeds easily (due to hx VWD  no current bleeding  )  Psychiatric/Behavioral: Negative for sleep disturbance  PHYSICAL EXAMINATION:     Vital Signs:   /84   Pulse 86   Temp 98 8 °F (37 1 °C)   Resp 18   Ht 5' 4" (1 626 m)   Wt 112 kg (246 lb)   LMP 07/31/2022 (Exact Date)   SpO2 96%   BMI 42 23 kg/m²   Body surface area is 2 14 meters squared     Ht Readings from Last 8 Encounters:   01/03/23 5' 4" (1 626 m)   12/23/22 5' 4" (1 626 m)   12/13/22 5' 4" (1 626 m)   12/09/22 5' 4" (1 626 m)   11/03/22 5' 4" (1 626 m)   11/02/22 5' 4" (1 626 m)   10/10/22 5' 4" (1 626 m)   09/26/22 5' 4" (1 626 m)       Wt Readings from Last 8 Encounters:   01/03/23 112 kg (246 lb)   12/23/22 108 kg (238 lb 9 6 oz)   12/13/22 111 kg (244 lb)   12/09/22 111 kg (244 lb 11 4 oz)   12/05/22 111 kg (244 lb 9 6 oz)   11/08/22 112 kg (246 lb)   11/03/22 111 kg (245 lb 9 5 oz)   11/02/22 111 kg (244 lb)          Physical Exam  Constitutional:       General: She is not in acute distress  Appearance: Normal appearance  She is not ill-appearing, toxic-appearing or diaphoretic  HENT:      Head: Normocephalic and atraumatic  Eyes:      General: No scleral icterus  Extraocular Movements: Extraocular movements intact  Conjunctiva/sclera: Conjunctivae normal       Pupils: Pupils are equal, round, and reactive to light  Cardiovascular:      Rate and Rhythm: Normal rate and regular rhythm  Heart sounds: Normal heart sounds  No murmur heard  Pulmonary:      Effort: Pulmonary effort is normal  No respiratory distress  Abdominal:      Tenderness: There is no abdominal tenderness  Musculoskeletal:         General: No tenderness  Normal range of motion  Cervical back: Normal range of motion and neck supple  Right lower leg: No edema  Left lower leg: No edema  Lymphadenopathy:      Cervical: No cervical adenopathy  Skin:     General: Skin is warm and dry  Coloration: Skin is not jaundiced or pale  Findings: No bruising, erythema, lesion or rash  Neurological:      General: No focal deficit present  Mental Status: She is alert and oriented to person, place, and time  Mental status is at baseline  Motor: No weakness  Psychiatric:         Mood and Affect: Mood normal          Behavior: Behavior normal          Thought Content:  Thought content normal          Judgment: Judgment normal        Reviewed historical information        PAST MEDICAL HISTORY:    Past Medical History:   Diagnosis Date   • Anxiety    • Herpes     hasn't had an outbreak in years   • Varicella     had chicken pox   • Von Willebrand disease        PAST SURGICAL HISTORY:    Past Surgical History:   Procedure Laterality Date   •  SECTION     • TONSILLECTOMY     • WISDOM TOOTH EXTRACTION           CURRENT MEDICATIONS:     Current Outpatient Medications:   •  escitalopram (LEXAPRO) 5 mg tablet, Take 5 mg by mouth daily, Disp: , Rfl:   •  labetalol (NORMODYNE) 100 mg tablet, Take 1 tablet (100 mg total) by mouth 2 (two) times a day, Disp: 60 tablet, Rfl: 3  •  MAGNESIUM PO, Take 1 tablet by mouth in the morning, Disp: , Rfl:   •  Prenatal Vit-DSS-Fe Fum-FA (Prenatal 19) tablet, Take by mouth daily, Disp: , Rfl:   •  hydrOXYzine HCL (ATARAX) 25 mg tablet, Take 25 mg by mouth every 6 (six) hours as needed (Patient not taking: Reported on 2022), Disp: , Rfl:     SOCIAL HISTORY:    Social History     Tobacco Use   • Smoking status: Every Day     Packs/day: 0 25     Types: Cigarettes   • Smokeless tobacco: Never   • Tobacco comments:     1 cigg a day   Vaping Use   • Vaping Use: Never used   Substance Use Topics   • Alcohol use: Not Currently   • Drug use: Never       FAMILY HISTORY:    Family History   Problem Relation Age of Onset   • Breast cancer Mother    • Hypertension Mother    • Hypertension Father    • Asthma Brother    • Asthma Daughter        ALLERGIES:  No Known Allergies      LAB:    Lab Results   Component Value Date    WBC 10 97 (H) 2022    HGB 12 4 2022    HCT 36 5 2022    MCV 95 2022     2022       Lab Results   Component Value Date    SODIUM 138 2022    K 4 0 2022     2022    CO2 23 2022    AGAP 7 2022    BUN 5 2022    CREATININE 0 45 (L) 2022    GLUC 100 2022    CALCIUM 9 3 12/09/2022    AST 15 12/09/2022    ALT 58 (H) 12/09/2022    ALKPHOS 80 12/09/2022    TP 6 6 12/09/2022    TBILI 0 32 12/09/2022    EGFR 126 12/09/2022       IMAGING:  Echo complete w/ contrast if indicated  •  Left Ventricle: Left ventricular cavity size is normal  Wall thickness   is upper normal  The left ventricular ejection fraction is 60%   Systolic   function is normal  Wall motion is normal  Diastolic function is normal

## 2023-01-03 ENCOUNTER — CONSULT (OUTPATIENT)
Dept: HEMATOLOGY ONCOLOGY | Facility: CLINIC | Age: 38
End: 2023-01-03

## 2023-01-03 VITALS
WEIGHT: 246 LBS | TEMPERATURE: 98.8 F | OXYGEN SATURATION: 96 % | SYSTOLIC BLOOD PRESSURE: 122 MMHG | BODY MASS INDEX: 42 KG/M2 | RESPIRATION RATE: 18 BRPM | DIASTOLIC BLOOD PRESSURE: 84 MMHG | HEART RATE: 86 BPM | HEIGHT: 64 IN

## 2023-01-03 DIAGNOSIS — Z34.92 SECOND TRIMESTER PREGNANCY: ICD-10-CM

## 2023-01-03 DIAGNOSIS — D68.00 VON WILLEBRANDS DISEASE: Primary | ICD-10-CM

## 2023-01-03 RX ORDER — ESCITALOPRAM OXALATE 5 MG/1
5 TABLET ORAL DAILY
COMMUNITY
Start: 2022-12-15

## 2023-01-04 ENCOUNTER — ROUTINE PRENATAL (OUTPATIENT)
Dept: OBGYN CLINIC | Facility: CLINIC | Age: 38
End: 2023-01-04

## 2023-01-04 VITALS — DIASTOLIC BLOOD PRESSURE: 76 MMHG | WEIGHT: 244 LBS | BODY MASS INDEX: 41.88 KG/M2 | SYSTOLIC BLOOD PRESSURE: 120 MMHG

## 2023-01-04 DIAGNOSIS — Z3A.22 22 WEEKS GESTATION OF PREGNANCY: ICD-10-CM

## 2023-01-04 DIAGNOSIS — O09.522 MULTIGRAVIDA OF ADVANCED MATERNAL AGE IN SECOND TRIMESTER: Primary | ICD-10-CM

## 2023-01-04 NOTE — PROGRESS NOTES
42 y o   female at 22w3d (Estimated Date of Delivery: 23) for PNV  Pre-Luc Vitals    Flowsheet Row Most Recent Value   Prenatal Assessment    Movement Present   Prenatal Vitals    Blood Pressure 120/76   Weight - Scale 111 kg (244 lb)   Urine Albumin/Glucose    Dilation/Effacement/Station    Vaginal Drainage    Edema         TWG: -7 258 kg (-16 lb)  VWF- repeat labs at 36 weeks  Per heme/ onc give DDAVP 2 hours prior to c/s  cHTN on labetalol  Pt  States she titrated down to 50mg po twice daily and BP are good at home     Reviewed level II u/s

## 2023-01-12 ENCOUNTER — TELEPHONE (OUTPATIENT)
Dept: OBGYN CLINIC | Facility: CLINIC | Age: 38
End: 2023-01-12

## 2023-01-12 ENCOUNTER — NURSE TRIAGE (OUTPATIENT)
Dept: OTHER | Facility: OTHER | Age: 38
End: 2023-01-12

## 2023-01-12 NOTE — TELEPHONE ENCOUNTER
Patient is COVID positive since 1/5 and continues with fatigue and congestion  Patient called her PCP and was advised to contact her OB office  Reason for Disposition  • HIGH RISK for severe COVID complications (e g , weak immune system, age > 59 years, obesity with BMI > 22, pregnant, chronic lung disease or other chronic medical condition) (Exception: Already seen by PCP and no new or worsening symptoms )    Answer Assessment - Initial Assessment Questions  1  COVID-19 DIAGNOSIS: "Who made your COVID-19 diagnosis?" "Was it confirmed by a positive lab test or self-test?" If not diagnosed by a doctor (or NP/PA), ask "Are there lots of cases (community spread) where you live?" Note: See public health department website, if unsure  1/5 via home test   2  COVID-19 EXPOSURE: "Was there any known exposure to COVID before the symptoms began?" CDC Definition of close contact: within 6 feet (2 meters) for a total of 15 minutes or more over a 24-hour period  Exposed to sister in law   3  ONSET: "When did the COVID-19 symptoms start?"       Thursday   4  WORST SYMPTOM: "What is your worst symptom?" (e g , cough, fever, shortness of breath, muscle aches)      Fatigue "I just feel very fatigued and out of it "   5  COUGH: "Do you have a cough?" If Yes, ask: "How bad is the cough?"        Yes, mild "I feel like it is there but nothing is coming up "   6  FEVER: "Do you have a fever?" If Yes, ask: "What is your temperature, how was it measured, and when did it start?"      Fevers and chills on Thursday   7  RESPIRATORY STATUS: "Describe your breathing?" (e g , shortness of breath, wheezing, unable to speak)       "I don't think I am having problems breathing  My nail beds and lips are pink "   8  BETTER-SAME-WORSE: "Are you getting better, staying the same or getting worse compared to yesterday?"  If getting worse, ask, "In what way?"      Little better   9   HIGH RISK DISEASE: "Do you have any chronic medical problems?" (e g , asthma, heart or lung disease, weak immune system, obesity, etc )      Palpitations   10  VACCINE: "Have you had the COVID-19 vaccine?" If Yes, ask: "Which one, how many shots, when did you get it?"        Yes, moderna   11  BOOSTER: "Have you received your COVID-19 booster?" If Yes, ask: "Which one and when did you get it?"        One   12  PREGNANCY: "Is there any chance you are pregnant?" "When was your last menstrual period?"        23 weeks   13  OTHER SYMPTOMS: "Do you have any other symptoms?"  (e g , chills, fatigue, headache, loss of smell or taste, muscle pain, sore throat)        Fatigue, "It is almost like I want to sweat but I don't have a fever ", congestion   14   O2 SATURATION MONITOR:  "Do you use an oxygen saturation monitor (pulse oximeter) at home?" If Yes, ask "What is your reading (oxygen level) today?" "What is your usual oxygen saturation reading?" (e g , 95%)        Denies    Protocols used: CORONAVIRUS (COVID-19) DIAGNOSED OR SUSPECTED-ADULT-OH

## 2023-01-12 NOTE — TELEPHONE ENCOUNTER
Regardin wks pregnant/ covid positive/ medical advice  ----- Message from Dee Arana sent at 2023  8:25 AM EST -----  " I'm 23 weeks pregnant and tested positive for covid   I just want to get some medical advice "

## 2023-01-12 NOTE — TELEPHONE ENCOUNTER
Pt  C/o feeling fatigued  Declines ctxns, LOF, vaginal bleeding  Endorses baseline FM  Declines SOB, chest pain  Advised increased hydration and rest as needed

## 2023-01-17 ENCOUNTER — OFFICE VISIT (OUTPATIENT)
Dept: CARDIOLOGY CLINIC | Facility: CLINIC | Age: 38
End: 2023-01-17

## 2023-01-17 VITALS
BODY MASS INDEX: 41.66 KG/M2 | SYSTOLIC BLOOD PRESSURE: 134 MMHG | OXYGEN SATURATION: 98 % | RESPIRATION RATE: 16 BRPM | DIASTOLIC BLOOD PRESSURE: 74 MMHG | HEART RATE: 96 BPM | WEIGHT: 244 LBS | HEIGHT: 64 IN

## 2023-01-17 DIAGNOSIS — I47.29 NSVT (NONSUSTAINED VENTRICULAR TACHYCARDIA): ICD-10-CM

## 2023-01-17 RX ORDER — LABETALOL 100 MG/1
50 TABLET, FILM COATED ORAL 2 TIMES DAILY
Start: 2023-01-17

## 2023-01-17 NOTE — PROGRESS NOTES
[address] [facility]   Office Consultation    Lyndsey Lucero 42 y o  female MRN: [id number]    01/17/23          Assessment:  1  Palpitations   2  Hypertension   3  IUP  4  Anxiety   5  Tobacco abuse    Plan:  · She reports symptom resolution  Her blood pressure has been well controlled  No further ventricular arrhythmia noted on the remainder of her event monitor following initiation of labetalol  TTE revealed preserved LV function  Cont labetalol 50 mg BID  Ambulatory blood pressure monitoring was advised  · She was advised to notify us with the onset of cardiac symptoms  Follow up: 4 months or sooner as needed    1  NSVT (nonsustained ventricular tachycardia)  labetalol (NORMODYNE) 100 mg tablet          HPI: Lyndsey Lucero is a 42y o  year old female with history of anxiety and IUP presents for routine follow up  On her previous evaluation she noted palpitations and was hypertensive  She was evaluated with an event monitor that revealed a 6 beat run of monomorphic NSVT  She was started on labetalol 100 mg twice daily  She noted lightheadedness and cut the dose to 50 mg twice daily  Her symptoms have resolved  There was no further ventricular arrhythmia noted on the remainder of her event monitor following initiation of labetalol  Her blood pressure has improved on ambulatory monitoring  TTE revealed EF 60% with no significant valvular heart disease  She follows closely with her OB GYN and reports no pregnancy related complications        Family History: mother with atrial fibrillation; father with PVCs    Social history: smokes 3 cigarettes per day; prior 1/2 ppd      No Known Allergies      Current Outpatient Medications:   •  escitalopram (LEXAPRO) 5 mg tablet, Take 5 mg by mouth daily, Disp: , Rfl:   •  labetalol (NORMODYNE) 100 mg tablet, Take 0 5 tablets (50 mg total) by mouth 2 (two) times a day, Disp: , Rfl:   •  Prenatal Vit-DSS-Fe Fum-FA (Prenatal 19) tablet, Take by mouth daily, Disp: , Rfl:   •  hydrOXYzine HCL (ATARAX) 25 mg tablet, Take 25 mg by mouth every 6 (six) hours as needed (Patient not taking: Reported on 2022), Disp: , Rfl:   •  MAGNESIUM PO, Take 1 tablet by mouth in the morning (Patient not taking: Reported on 2023), Disp: , Rfl:     Past Medical History:   Diagnosis Date   • Anxiety    • Herpes     hasn't had an outbreak in years   • Varicella     had chicken pox   • Von Willebrand disease        Family History   Problem Relation Age of Onset   • Breast cancer Mother    • Hypertension Mother    • Hypertension Father    • Asthma Brother    • Asthma Daughter        Past Surgical History:   Procedure Laterality Date   •  SECTION     • TONSILLECTOMY     • WISDOM TOOTH EXTRACTION         Social History     Socioeconomic History   • Marital status: /Civil Union     Spouse name: Not on file   • Number of children: Not on file   • Years of education: Not on file   • Highest education level: Not on file   Occupational History   • Not on file   Tobacco Use   • Smoking status: Every Day     Packs/day: 0 25     Types: Cigarettes   • Smokeless tobacco: Never   • Tobacco comments:     1 cigg a day   Vaping Use   • Vaping Use: Never used   Substance and Sexual Activity   • Alcohol use: Not Currently   • Drug use: Never   • Sexual activity: Yes     Partners: Male     Birth control/protection: None   Other Topics Concern   • Not on file   Social History Narrative   • Not on file     Social Determinants of Health     Financial Resource Strain: Not on file   Food Insecurity: Not on file   Transportation Needs: Not on file   Physical Activity: Not on file   Stress: Not on file   Social Connections: Not on file   Intimate Partner Violence: Not on file   Housing Stability: Not on file       Review of Systems   Constitutional: Negative for diaphoresis, weight gain and weight loss  HENT: Negative for congestion      Cardiovascular: Negative for chest pain, dyspnea on exertion, irregular heartbeat, leg swelling, near-syncope, orthopnea, palpitations, paroxysmal nocturnal dyspnea and syncope  Respiratory: Negative for shortness of breath, sleep disturbances due to breathing and snoring  Hematologic/Lymphatic: Does not bruise/bleed easily  Skin: Negative for rash  Musculoskeletal: Negative for myalgias  Gastrointestinal: Negative for nausea and vomiting  Neurological: Negative for excessive daytime sleepiness and light-headedness  Psychiatric/Behavioral: The patient is not nervous/anxious  Vitals: /74 (BP Location: Left arm, Patient Position: Sitting, Cuff Size: Large)   Pulse 96   Resp 16   Ht 5' 4" (1 626 m)   Wt 111 kg (244 lb)   LMP 07/31/2022 (Exact Date)   SpO2 98%   BMI 41 88 kg/m²       Physical Exam:     GEN: Alert and oriented x 3, in no acute distress  Well appearing and well nourished  HEENT: Sclera anicteric, conjunctivae pink, mucous membranes moist  Oropharynx clear  NECK: Supple, no carotid bruits, no significant JVD  Trachea midline, no thyromegaly  HEART: Regular rhythm, normal S1 and S2, no murmurs, clicks, gallops or rubs  PMI nondisplaced, no thrills  LUNGS: Clear to auscultation bilaterally; no wheezes, rales, or rhonchi  No increased work of breathing or signs of respiratory distress  EXTREMITIES: Skin warm and well perfused, no clubbing, cyanosis, or edema  NEURO: No focal findings  Normal speech  Mood and affect normal    SKIN: Normal without suspicious lesions on exposed skin

## 2023-01-25 ENCOUNTER — TELEPHONE (OUTPATIENT)
Dept: SURGICAL ONCOLOGY | Facility: CLINIC | Age: 38
End: 2023-01-25

## 2023-01-25 NOTE — TELEPHONE ENCOUNTER
Called patient to schedule 1 year follow up with Yamileth  There was no answer so left detailed message with reason for call  Let patient know that she is scheduled with Yamileth 1/4/2024 at 10:00 am  Also left number for patient to call back if time or date doesn't work for her schedule

## 2023-01-31 DIAGNOSIS — I47.29 NSVT (NONSUSTAINED VENTRICULAR TACHYCARDIA): ICD-10-CM

## 2023-01-31 RX ORDER — LABETALOL 100 MG/1
TABLET, FILM COATED ORAL
Qty: 180 TABLET | Refills: 1 | Status: SHIPPED | OUTPATIENT
Start: 2023-01-31 | End: 2023-02-10

## 2023-02-02 ENCOUNTER — ROUTINE PRENATAL (OUTPATIENT)
Dept: OBGYN CLINIC | Facility: CLINIC | Age: 38
End: 2023-02-02

## 2023-02-02 VITALS — DIASTOLIC BLOOD PRESSURE: 78 MMHG | WEIGHT: 250.4 LBS | BODY MASS INDEX: 42.98 KG/M2 | SYSTOLIC BLOOD PRESSURE: 122 MMHG

## 2023-02-02 DIAGNOSIS — D68.00 VON WILLEBRANDS DISEASE: ICD-10-CM

## 2023-02-02 DIAGNOSIS — O10.912 MATERNAL CHRONIC HYPERTENSION, SECOND TRIMESTER: Primary | ICD-10-CM

## 2023-02-02 DIAGNOSIS — Z3A.26 26 WEEKS GESTATION OF PREGNANCY: ICD-10-CM

## 2023-02-02 DIAGNOSIS — O09.522 ELDERLY MULTIGRAVIDA, SECOND TRIMESTER: ICD-10-CM

## 2023-02-02 NOTE — PROGRESS NOTES
42 y o    female at 33 2 wga for PNV  BP : 122/78  TWG: -8  43 y o   female at 30w1d (Estimated Date of Delivery: 23) for PNV  Pre-Luc Vitals    Flowsheet Row Most Recent Value   Prenatal Assessment    Movement Present   Prenatal Vitals    Blood Pressure 122/78   Weight - Scale 114 kg (250 lb 6 4 oz)   Urine Albumin/Glucose    Dilation/Effacement/Station    Vaginal Drainage    Edema         TWG: -4 355 kg (-9 lb 9 6 oz)    Cramping/contractions: no  Bleeding: no  LOF: no  FM: no  28 wk labs ordered: yes  TSH indicated & ordered: no  RH negative - type & screen ordered: yes  Prenatal labs complete (including Heb B, HIV): no : if NO, add to labs today  RTO in 2 weeks

## 2023-02-02 NOTE — PROGRESS NOTES
Pt is here for a routine prenatal, 26 week labs today  Pt is well, states fatigue has increased lately   Denies vaginal bleeding and leakage of fluid

## 2023-02-03 ENCOUNTER — APPOINTMENT (OUTPATIENT)
Dept: LAB | Facility: CLINIC | Age: 38
End: 2023-02-03

## 2023-02-03 DIAGNOSIS — D68.00 VON WILLEBRANDS DISEASE: Primary | ICD-10-CM

## 2023-02-03 DIAGNOSIS — O10.912 MATERNAL CHRONIC HYPERTENSION, SECOND TRIMESTER: ICD-10-CM

## 2023-02-03 LAB
ABO GROUP BLD: NORMAL
BLD GP AB SCN SERPL QL: NEGATIVE
ERYTHROCYTE [DISTWIDTH] IN BLOOD BY AUTOMATED COUNT: 13.1 % (ref 11.6–15.1)
GLUCOSE 1H P 50 G GLC PO SERPL-MCNC: 140 MG/DL (ref 40–134)
HCT VFR BLD AUTO: 35.5 % (ref 34.8–46.1)
HGB BLD-MCNC: 11.9 G/DL (ref 11.5–15.4)
MCH RBC QN AUTO: 32 PG (ref 26.8–34.3)
MCHC RBC AUTO-ENTMCNC: 33.5 G/DL (ref 31.4–37.4)
MCV RBC AUTO: 95 FL (ref 82–98)
PLATELET # BLD AUTO: 257 THOUSANDS/UL (ref 149–390)
PMV BLD AUTO: 9.6 FL (ref 8.9–12.7)
RBC # BLD AUTO: 3.72 MILLION/UL (ref 3.81–5.12)
RH BLD: NEGATIVE
SPECIMEN EXPIRATION DATE: NORMAL
VIT B12 SERPL-MCNC: 287 PG/ML (ref 100–900)
WBC # BLD AUTO: 10.83 THOUSAND/UL (ref 4.31–10.16)

## 2023-02-04 LAB
APTT PPP: 31 SECONDS (ref 23–37)
RPR SER QL: NORMAL

## 2023-02-06 DIAGNOSIS — R73.09 ELEVATED GLUCOSE LEVEL: Primary | ICD-10-CM

## 2023-02-06 LAB
FACT XIIIA PPP-ACNC: 36 % (ref 56–140)
VWF AG ACT/NOR PPP IA: 65 % (ref 50–200)
VWF:RCO ACT/NOR PPP PL AGG: 53 % (ref 50–200)

## 2023-02-10 ENCOUNTER — ULTRASOUND (OUTPATIENT)
Dept: PERINATAL CARE | Facility: OTHER | Age: 38
End: 2023-02-10

## 2023-02-10 VITALS
DIASTOLIC BLOOD PRESSURE: 70 MMHG | WEIGHT: 253.8 LBS | BODY MASS INDEX: 43.33 KG/M2 | HEART RATE: 72 BPM | HEIGHT: 64 IN | SYSTOLIC BLOOD PRESSURE: 120 MMHG

## 2023-02-10 DIAGNOSIS — O09.522 MULTIGRAVIDA OF ADVANCED MATERNAL AGE IN SECOND TRIMESTER: ICD-10-CM

## 2023-02-10 DIAGNOSIS — O10.912 MATERNAL CHRONIC HYPERTENSION, SECOND TRIMESTER: Primary | ICD-10-CM

## 2023-02-10 DIAGNOSIS — O99.212 OBESITY AFFECTING PREGNANCY IN SECOND TRIMESTER: ICD-10-CM

## 2023-02-10 DIAGNOSIS — O99.810 ABNORMAL GLUCOSE COMPLICATING PREGNANCY: ICD-10-CM

## 2023-02-10 DIAGNOSIS — U07.1 COVID-19 AFFECTING PREGNANCY IN SECOND TRIMESTER: ICD-10-CM

## 2023-02-10 DIAGNOSIS — Z3A.27 27 WEEKS GESTATION OF PREGNANCY: ICD-10-CM

## 2023-02-10 DIAGNOSIS — O98.512 COVID-19 AFFECTING PREGNANCY IN SECOND TRIMESTER: ICD-10-CM

## 2023-02-10 DIAGNOSIS — I47.29 NSVT (NONSUSTAINED VENTRICULAR TACHYCARDIA): ICD-10-CM

## 2023-02-10 DIAGNOSIS — Z03.72 SUSPECTED PROBLEM WITH PLACENTA NOT FOUND: ICD-10-CM

## 2023-02-10 PROBLEM — O44.42 LOW-LYING PLACENTA WITHOUT HEMORRHAGE, SECOND TRIMESTER: Status: RESOLVED | Noted: 2022-12-23 | Resolved: 2023-02-10

## 2023-02-10 RX ORDER — LABETALOL 100 MG/1
50 TABLET, FILM COATED ORAL 2 TIMES DAILY
Qty: 180 TABLET | Refills: 1
Start: 2023-02-10

## 2023-02-10 NOTE — PROGRESS NOTES
Ultrasound Probe Disinfection    A transvaginal ultrasound was performed  Prior to use, disinfection was performed with High Level [employer]infection Process ([email or web address])  Probe serial number [id number]: [id number] was used        Wendy Colon  02/10/23  12:21 PM

## 2023-02-10 NOTE — PROGRESS NOTES
Mitchell Loyola has no complaints today  She reports regular fetal movements and does not report any problems  She is here today at 27w5d for an ultrasound for growth and the missed anatomy  Problem list:  1  Advanced maternal age with normal NIPT  2  Pregravid BMI of 44  3  Possible chronic hypertension and palpitations that are treated by labetalol 50 mg twice daily  This is being managed by cardiology  4  Low-lying placenta  5  History of a prior   6  Developed COVID in the second trimester of this pregnancy  7  Elevated Glucola of 140  [place] [employer] bought a glucometer and would rather check her blood sugars then complete a 3-hour glucose tolerance test   8  Von Willebrand's  She saw Stephanie Adams PA-C through hematology oncology on 1/3/2023  She is getting her previous records  She recommends treatment with DDAVP 150 mcg in each nostril 2 hours prior to surgery  Afterwards she will have twice weekly labs CBC and von Willebrand factor for 6 weeks due to high risk of a postpartum hemorrhage  Recommend providing transaminase acid if bleeding occurs  Ultrasound findings: The ultrasound today shows normal interval fetal growth and fluid  The missed anatomy was reviewed and no malformations were detected but we are unable to complete all of the missed anatomy secondary to fetal position and maternal skin density  The low lying placenta resolved  Pregnancy ultrasound has limitations and is unable to detect all forms of fetal congenital abnormalities  The inaccuracy in the EFW can be off by 1 lb either way in the third trimester  Specific counseling was provided on the following problems:  1  Recommend she check fasting blood sugars and 2-hour postprandials ( 2 hrs after starting a meal that is eaten in 15 minutes) with each meal for 1 week  She will send a copy of these sugars through [employer] to myself    Normal fasting blood sugars are less than 95 and normal 2-hour postprandials are less than 120  Follow up recommended:   1  Recommend a follow-up ultrasound for growth and missed anatomy in 4 weeks  2  Weekly fetal testing is recommended starting at 32 weeks  This can start with her next US in [facility] or can be scheduled through her ob office  Pre visit time reviewing her records   10 minutes  Face to face time 15 minutes  Post visit time on documentation of note, updating her problem list, adding orders and prescriptions 10 minutes  Procedures that were completed today were charged separately  The level of decision making was moderate complexity      Flako Worrell MD

## 2023-02-10 NOTE — LETTER
February 10, 2023     [address] [place] [address]  [address]  [facility] [address]    Patient: Jg White   YOB: 1985   Date of Visit: 2/10/2023       Dear Dr Krupa Rodriguez:    Thank you for referring Jg White to me for evaluation  Below are my notes for this consultation  If you have questions, please do not hesitate to call me  I look forward to following your patient along with you  Sincerely,        Chato Mijares MD        CC: No Recipients  Chato Mijares MD  2/10/2023  6:10 PM  Sign when Signing Visit  Jg White has no complaints today  She reports regular fetal movements and does not report any problems  She is here today at 27w5d for an ultrasound for growth and the missed anatomy  Problem list:  1  Advanced maternal age with normal NIPT  2  Pregravid BMI of 44  3  Possible chronic hypertension and palpitations that are treated by labetalol 50 mg twice daily  This is being managed by cardiology  4  Low-lying placenta  5  History of a prior   6  Developed COVID in the second trimester of this pregnancy  7  Elevated Glucola of 140  [employer] bought a glucometer and would rather check her blood sugars then complete a 3-hour glucose tolerance test   8  Von Willebrand's  She saw Lisa Haley PA-C through hematology oncology on 1/3/2023  She is getting her previous records  She recommends treatment with DDAVP 150 mcg in each nostril 2 hours prior to surgery  Afterwards she will have twice weekly labs CBC and von Willebrand factor for 6 weeks due to high risk of a postpartum hemorrhage  Recommend providing transaminase acid if bleeding occurs  Ultrasound findings: The ultrasound today shows normal interval fetal growth and fluid  The missed anatomy was reviewed and no malformations were detected but we are unable to complete all of the missed anatomy secondary to fetal position and maternal skin density   The low lying placenta resolved  Pregnancy ultrasound has limitations and is unable to detect all forms of fetal congenital abnormalities  The inaccuracy in the EFW can be off by 1 lb either way in the third trimester  Specific counseling was provided on the following problems:  1  Recommend she check fasting blood sugars and 2-hour postprandials ( 2 hrs after starting a meal that is eaten in 15 minutes) with each meal for 1 week  She will send a copy of these sugars through [employer] to myself  Normal fasting blood sugars are less than 95 and normal 2-hour postprandials are less than 120  Follow up recommended:   1  Recommend a follow-up ultrasound for growth and missed anatomy in 4 weeks  2  Weekly fetal testing is recommended starting at 32 weeks  This can start with her next US in [facility] or can be scheduled through her ob office  Pre visit time reviewing her records   10 minutes  Face to face time 15 minutes  Post visit time on documentation of note, updating her problem list, adding orders and prescriptions 10 minutes  Procedures that were completed today were charged separately  The level of decision making was moderate complexity      Enzo Ventura MD

## 2023-02-16 LAB — FACT VIII AG ACT/NOR PPP IA: 80 %

## 2023-02-17 LAB — VWF MULTIMERS PPP IB: NORMAL

## 2023-02-19 DIAGNOSIS — O24.410 DIET CONTROLLED GESTATIONAL DIABETES MELLITUS (GDM) IN THIRD TRIMESTER: Primary | ICD-10-CM

## 2023-02-19 DIAGNOSIS — Z3A.29 29 WEEKS GESTATION OF PREGNANCY: ICD-10-CM

## 2023-02-19 NOTE — PROGRESS NOTES
Patient reported blood sugars using a glucometer for a week as testing for gestational diabetes and she has several 2-hour postprandials that are 120 or greater  Will refer to diabetes education  Patient notified through [address] message      Recardo Simmonds Schucker

## 2023-02-20 ENCOUNTER — TELEPHONE (OUTPATIENT)
Facility: HOSPITAL | Age: 38
End: 2023-02-20

## 2023-02-20 NOTE — TELEPHONE ENCOUNTER
Called patient to schedule MFM appointment, based on referral issued to Maternal Fetal Medicine by [facility] office  Left voicemail requesting patient to call back and schedule appointment, with office number for return call [phone]

## 2023-02-24 ENCOUNTER — TELEMEDICINE (OUTPATIENT)
Dept: ANESTHESIOLOGY | Facility: CLINIC | Age: 38
End: 2023-02-24

## 2023-02-24 DIAGNOSIS — D68.00 VON WILLEBRANDS DISEASE: Primary | ICD-10-CM

## 2023-02-24 DIAGNOSIS — O10.912 MATERNAL CHRONIC HYPERTENSION, SECOND TRIMESTER: ICD-10-CM

## 2023-02-24 NOTE — PROGRESS NOTES
Virtual Regular Visit    Verification of patient location:    Patient is located in the following state in which I hold an active license [place] and [place]    Assessment/Plan:  Lesa Hicks has a history of mild vWD diagnosed at age 19 during a workup for menorrhagia  She had a previous c/s under general anesthesia due to her history at an OSH however workup during this pregnancy reveals a very mild form of vWD and she is a candidate for spinal anesthesia for her upcoming c/s  I agree with hematology's recommendations for administration of DDAVP 2 hours prior to delivery and a dose of TXA after delivery  No further workup is required as long as her clinical picture does not change  Problem List Items Addressed This Visit    None           Reason for visit is No chief complaint on file  Encounter provider [facility] ANESTESIOLOGIST    Provider located at [address] [facility]  [address]  [phone]      Recent Visits  No visits were found meeting these conditions  Showing recent visits within past 7 days and meeting all other requirements  Future Appointments  No visits were found meeting these conditions  Showing future appointments within next 150 days and meeting all other requirements       The patient was identified by name and date of birth  Tanisha Eng was informed that this is a telemedicine visit and that the visit is being conducted through [facility]  She acknowledged consent and understanding of privacy and security of the video platform  The patient has agreed to participate and understands they can discontinue the visit at any time  Patient is aware this is a billable service  Subjective  Tanisha Eng is a 42 y o  female  with history of previous  delivery and mild vWD who presents to discuss anesthesia options for delivery   She previous delivered at an OSH and the anesthesiologist refused to place a neuraxial anesthetic due to her history of vWD  She labored and required a c/s due to failure to progress under general anesthesia  She was diagnosed with vWD when she was worked up for menorrhagia at age 19  She has no other bleeding issues and denies associated issues  She is followed by hematology and her workup shows a mild form of vWD with largely normal coagulation studies  HPI   See above    Past Medical History:   Diagnosis Date   • Anxiety    • Herpes     hasn't had an outbreak in years   • Varicella     had chicken pox   • Von Willebrand disease        Past Surgical History:   Procedure Laterality Date   •  SECTION     • TONSILLECTOMY     • WISDOM TOOTH EXTRACTION         Current Outpatient Medications   Medication Sig Dispense Refill   • escitalopram (LEXAPRO) 5 mg tablet Take 5 mg by mouth daily     • labetalol (NORMODYNE) 100 mg tablet Take 0 5 tablets (50 mg total) by mouth 2 (two) times a day 180 tablet 1   • Prenatal Vit-DSS-Fe Fum-FA (Prenatal 19) tablet Take by mouth daily       No current facility-administered medications for this visit  No Known Allergies    Review of Systems   Constitutional: Negative for chills and fever  HENT: Negative for ear pain and sore throat  Eyes: Negative for pain and visual disturbance  Respiratory: Negative for cough and shortness of breath  Cardiovascular: Negative for chest pain and palpitations  Gastrointestinal: Negative for abdominal pain and vomiting  Genitourinary: Negative for dysuria and hematuria  Musculoskeletal: Negative for arthralgias and back pain  Skin: Negative for color change and rash  Neurological: Negative for seizures and syncope  All other systems reviewed and are negative  Video Exam    There were no vitals filed for this visit  Physical Exam  Vitals and nursing note reviewed  Constitutional:       Appearance: Normal appearance  HENT:      Head: Normocephalic and atraumatic        Right Ear: External ear normal       Left Ear: External ear normal       Nose: Nose normal       Mouth/Throat:      Mouth: Mucous membranes are moist       Pharynx: Oropharynx is clear  Eyes:      Conjunctiva/sclera: Conjunctivae normal    Pulmonary:      Effort: Pulmonary effort is normal    Abdominal:      Comments: Normal gravid abdomen   Musculoskeletal:         General: Normal range of motion  Cervical back: Normal range of motion  Neurological:      General: No focal deficit present  Mental Status: She is alert and oriented to person, place, and time  Mental status is at baseline     Psychiatric:         Mood and Affect: Mood normal

## 2023-02-27 ENCOUNTER — TELEMEDICINE (OUTPATIENT)
Dept: PERINATAL CARE | Facility: CLINIC | Age: 38
End: 2023-02-27

## 2023-02-27 DIAGNOSIS — O99.213 MATERNAL MORBID OBESITY, ANTEPARTUM, THIRD TRIMESTER (HCC): ICD-10-CM

## 2023-02-27 DIAGNOSIS — O99.213 OBESITY AFFECTING PREGNANCY IN THIRD TRIMESTER: ICD-10-CM

## 2023-02-27 DIAGNOSIS — O99.810 ABNORMAL GLUCOSE COMPLICATING PREGNANCY: ICD-10-CM

## 2023-02-27 DIAGNOSIS — O10.913 MATERNAL CHRONIC HYPERTENSION IN THIRD TRIMESTER: ICD-10-CM

## 2023-02-27 DIAGNOSIS — O09.523 MULTIGRAVIDA OF ADVANCED MATERNAL AGE IN THIRD TRIMESTER: ICD-10-CM

## 2023-02-27 DIAGNOSIS — O24.410 DIET CONTROLLED GESTATIONAL DIABETES MELLITUS (GDM) IN THIRD TRIMESTER: Primary | ICD-10-CM

## 2023-02-27 DIAGNOSIS — E66.01 MATERNAL MORBID OBESITY, ANTEPARTUM, THIRD TRIMESTER (HCC): ICD-10-CM

## 2023-02-27 DIAGNOSIS — Z3A.30 30 WEEKS GESTATION OF PREGNANCY: ICD-10-CM

## 2023-02-27 NOTE — PATIENT INSTRUCTIONS
CLASS 1  Diabetes and Pregnancy Program   formerly [facility] - Maternal Fetal Medicine    Diabetes Team:   Merle Crane) Mckinley Turk RD CDE Sharyon Nora Higinio Leiter) [place], [place] Johnie Parma, MAC    Our medical assistant, Brenda Bradshaw can be reached at [phone] Monday thru Friday 7:45 am - 4:15 pm      We look forward to helping you manage your gestational diabetes during pregnancy  CHECKING BLOOD SUGARS  Always carry your meter and testing supplies with you at all times  Bring your glucose meter to all your appointments in our [place] office  Prescription for Meter/Strips/Lancets:   A request for a glucose meter, test strips and lancets was sent to the provider for approval    Once your prescriptions are approved by the provider, your prescription will be sent electronically to your pharmacy  Be mindful the provider is seeing patients in the office today so allow ample time for yourprescriptions to be approved  Call your pharmacy to verify your medication was received electronically and is ready for pick-up before going to pharmacy  If you have any issues with coverage or your meter is unavailable, please reach out to our office at [phone]  How to Check Blood Sugars:  Wash your hands with soap and water or use waterless  to clean your hands  Alcohol can dry your fingers and is not recommended  Alcohol can also cause false, elevated glucose readings  AVOID scented soaps and hand sanitizers - scented soaps may include sugar which can cause inaccurate/elevated readings   Gather your glucose meter kit and supplies  Prepare your meter by inserting a new test strip  This will automatically turn on your meter  Make sure test strips are not   Use a new test strip each time  Prepare your lancing device by inserting the lancet               After the lancet is securely in place, twist off the top to reveal needle     Reattach lancing device top   Once lancing device top is reattached, you are now ready to prick the side of your fingertip to get a blood sample  You can adjust the depth setting as needed  We recommend to start at "4"  Apply the blood sample to test strip according to instructions  Make sure your sharp container is: heavy duty plastic, puncture-resistant lid, upright and stable, leak resistant, properly labeled  Record your blood sugar     Additional References and Video: http://www [employer]/    Frequency: 4 Times Daily     Timing:   Fasting   Test when you wake up before you have anything to eat or drink  At least 8hrs but no more than 10hrs from your bedtime snack  Exceeding 10hrs may result in inaccurate readings  2 hrs after the first bite of your meal (breakfast, lunch, dinner) **do not test for snacks    Goals:    Fasting  60-90   1 Hour   After Meals <140   2 Hours   After Meals <120   *please inform member of diabetes team if you begin testing 1hr blood sugars      REPORTING BLOOD SUGARS:   Please only pick ONE way to report to our team  Choose the best option for you  Repeatit Blood Glucose Flow sheet under "Track my Health"   Remember to click "save" for your readings to automatically upload into Epic  MyChart Message with Attachment(s)  Send a picture of a written blood sugar log   To: NARENDRA Finney (Medical Question - Non Urgent)  You may include up to 3 images per message  Leave Voicemail at [phone]   Include: Name, Date of Birth, and Date + Blood Sugars    The diabetes team will review your blood sugar log weekly till delivery       MEAL PLAN AND DIET INSTRUCTIONS    *Carbohydrates: Sources of food that increase your blood sugar (include: starches, fruits, milk, desserts, starchy vegetables such as corn, peas, squash)    Meal Plan (3 Meals and 3 Snacks)  Outlines grams of carbohydrates to have at each meal and snack  Minimum Carbohydrate Intake Daily (avoid ketosis): 175g *to be distributed throughout day as instructed in meal plan  Include Protein at Every Meal and Snack  Eat all 3 meals and 3 snacks  Eating every 2 to 3 5 hours during the day  Preferably at the same times every day  Avoid going long periods of time without eating  Be sure to have bedtime snack that includes at least 30 grams of carbohydrates and 2 ounces (14 grams) of protein  Refer to [employer] in [employer] (pages 15-17)   Each food listed is equal to 15g (1 Carbohydrate Serving)  Read Food Label   Check Total Carbohydrate  15g = 1 Carbohydrate Serving  Check Serving Size! The total carbohydrate amount listed is based on 1 serving size  Be careful as many items contain more than one serving per package  Check Total Sugars  Choose items with <15g per serving of total sugar    Exercise:  If ok by your OB try adding a 20-30 minute walk after a meal to help keep glucose within range  Try incorporating at least 10 minutes of walking after each meal    Additional Information: (to be reviewed at class 2)  Continue to follow up with your OB and MFM providers as recommended  Always have glucose available for hypoglycemia, use 15 by 15 rule  (Page 29 in booklet)  While sick or feeling ill, follow our sick day guidelines in our GDM booklet  (Page 28 in booklet)  For travel and dining out tips refer to your GDM booklet  See attachment (Page 31 in booklet)    Class 2 Information: Approximately 1 week following Class 1  Bring 3 Day Food Log (everything you eat and drink for each meal and snack) or write down meals associated with elevated after meal blood sugars  Will review diet more in depth and provide feedback     *Remember: Importance of maintaining tight control of blood sugars during pregnancy = decrease risk factors including fetal macrosomia; birth injury; risk of ; polyhydramnios; pre-term labor; pre-eclampsia;  hypoglycemia; jaundice and stillbirth      Any questions give us a call at [phone] or send us a "[employer]"t message to [place], [place]       Jabari Deleon RD  Diabetes Educator   The Diabetes and Pregnancy Program  At [facility] - Maternal Fetal Medicine

## 2023-02-27 NOTE — PROGRESS NOTES
CLASS 1 - Group  (virtual visit)    Thank you for referring your patient to [facility] Diabetes and Pregnancy [facility]      Subjective:     David Salazar is a 42 y o  female who presents today unaccompanied for Virtual Regular Visit ([facility]; Link sent via Text Message), Gestational Diabetes (30w1d), and Patient Education (Class 1; Group)  Patient is at 30w1d gestation, Estimated Date of Delivery: 23  Reviewed and updated the following from patients medical record: Demographics, Education, Occupation, PMH, Problem List, Allergies, and Current Medications  D&P Assessment responses can be found in Attached Files  Visit Diagnosis:  Encounter Diagnosis     ICD-10-CM    1  Diet controlled gestational diabetes mellitus (GDM) in third trimester  O24 410 Ambulatory Referral to Maternal Fetal Medicine     Mychart glucose flowsheet      2  30 weeks gestation of pregnancy  Z3A 30 Ambulatory Referral to Maternal Fetal Medicine     Mychart glucose flowsheet    Weekly fetal testing is recommended starting at 32 weeks      3  Maternal chronic hypertension in third trimester  O10 913 Mychart glucose flowsheet      4  Maternal morbid obesity, antepartum, third trimester ([facility] [address] )  O99 213 Mychart glucose flowsheet    E66 01       5  Multigravida of advanced maternal age in third trimester  O09 523 Mychart glucose flowsheet      6  Obesity affecting pregnancy in third trimester  O99 213 Mychart glucose flowsheet      7  Abnormal glucose complicating pregnancy  N96 826 Mychart glucose flowsheet         Discussed with patient:  • Pathophysiology of Diet controlled gestational diabetes mellitus (GDM) in third trimester [O24 410]  • Untreated hyperglycemia in pregnancy and maternal fetal complications (fetal macrosomia,  hypoglycemia, polyhydramnios, increased incidence of  section,  labor, and in severe cases fetal demise and still birth)     • Importance of blood glucose monitoring, nutrition, and medication if necessary in achieving BG goals  HPI    Diabetes Hx:   • Personal History?  No  • Family History: None     Pregnancy Overview:   OB History    Para Term  AB Living   3 1 1   1 1   SAB IAB Ectopic Multiple Live Births   1       1      # Outcome Date GA Lbr Terrence/2nd Weight Sex Delivery Anes PTL Lv   3 Current            2 SAB 2021           1 Term 16 41w0d  3799 g (8 lb 6 oz) F CS-Unspec Gen  DARLENE     Pregnancy Plan:   Pregnancy: Adam     Delivery Plans  Planned delivery location: AN L&D     Post-Delivery Plans  Feeding intentions: Breast Milk     Labs  GDM LABS:  • 1 Hour GTT:   Lab Results   Component Value Date/Time    [id number] 140 (H) 2023 10:33 AM      • 3 Hour GTT: not completed; completed 1wk of SMBG instead of 3hr GTT    Labs Ordered This Visit: None    Current Outpatient Medications    Current Outpatient Medications:   •  escitalopram (LEXAPRO) 5 mg tablet, Take 5 mg by mouth daily, Disp: , Rfl:   •  labetalol (NORMODYNE) 100 mg tablet, Take 0 5 tablets (50 mg total) by mouth 2 (two) times a day, Disp: 180 tablet, Rfl: 1  •  Prenatal Vit-DSS-Fe Fum-FA (Prenatal 19) tablet, Take by mouth daily, Disp: , Rfl:      Preferred Pharmacy:   [id number]- [place], [place] - [address]  [address] [place] [address],[address] [place] [address]  Phone: [phone] Fax: [phone]     Anthropometrics:  Ht Readings from Last 1 Encounters:   02/10/23 5' 4" (1 626 m)      Wt Readings from Last 3 Encounters:   02/10/23 115 kg (253 lb 12 8 oz)   23 114 kg (250 lb 6 4 oz)   23 111 kg (244 lb)        Pre-Gravid Wt Pre-Gravid BMI TWG   118 kg (260 lb) 44 61 -2 812 kg (-6 lb 3 2 oz)     Total Pregnancy Weight Gain Recommendations: BMI (> 30) 11-20 lbs  • Current Wt Status Compared to Recommendations: Less Than -- Recommended to avoid additional weight loss till delivery    Most Recent Ultrasound Results:  • Findings: NML Growth/LON per OB   o Further Fetal Surveillance: Weekly fetal testing is recommended starting at 32 weeks  • Next US date: Scheduled Appropriately    Blood Glucose Monitoring:     Glucose Meter: Marian from [employer] - pt would prefer to continue using this meter; will purchase strips and lancets out of pocket   *orders for supplies (meter, lancets, strips) based on patients needs pended/routed to appropriate provider after today's visit for review/approval     Instructed on Testing Blood Sugars: 4 x per day (Fasting, 2 hour after start of each meal)  • Goals: (Fasting) 60-90mg/dl // (1hr PP) <140mg/dl // (2hr PP) <120mg/dl  • Meter Teaching: Gave instruction on site selection, skin preparation, loading strips and lancet device, meter activation, obtaining blood sample, test strip and lancet disposal and storage, and recording log book entries  o Blood Sugar Tested in Office? No (Virtual Visit)  • Instructed to report blood sugar results weekly via Phone: [phone] OR My Chart (Message with image attachment, or Glucose Flowsheet)     *Has been testing her blood sugars 2hrs after her meals; not testing fasting; reports after meal blood sugars have been good this past week, highest level is 118     Meal Plan: Patient was provided with a meal plan including 3 meals and 3 snacks  *Calories: 2000 calorie (CHO: 45-15-60-15-60-30) (PRO: 2/3-1-3/4-1-3/4-2)    Review of Patient's Current Diet: See Attached Files for Self-Assessment Form    Meal Plan Tips Reviewed at Today's Visit:  • Appropriate amounts of CHO, PRO, and Fat at each meal and snack  • CHO exchange list, and portion sizes for both CHO and PRO via food models  • How to read a food label  • Suggested meal/snack options to increase nutrition and maintain consistent meal and snack intakes  • Eat every 2 0-3 5 hours while awake  • Go no longer than 8-10 hours fasting overnight until first meal of the day  Physical Activity:  • Currently physically active?  Yes, Yoga  2-3 times a week    Reviewed w/ Pt:   • Benefits of physical activity to optimize blood glucose control, encouraged activity at patient is physically able    o Instructed pt to always consult a physician prior to starting an exercise program    • Recommend 20-30 minutes daily  Patient Stated Goal: "I will eat 3 meals and 3 snacks each day, including protein at each"    Expected Compliance: good  • Barriers to Learning/Change: No Barriers  • Diabetes Self Management Support Plan (outside of ongoing care): Spouse/Family     Date to Report Blood Sugars: Day Before Class 2, Then Weekly (till delivery)    Class 2: Return in 1 week (on 3/6/2023) for Class 2 w/ Rosa Parra RD CDE  *Patient instructed to bring 3 day food diary and/or write down foods associated with elevated after meal blood sugars    Begin Time: 1:00pm    End Time: 2:02pm    It was a pleasure working with them today  Please feel free to call ([phone]) with any questions or concerns      Kasie Gonzalez RD   Diabetes Educator  [facility]etal [facility]  Diabetes and Pregnancy Program  [address] [place] [address] 54, 210 [facility]        Virtual Regular Visit    Verification of patient location:    Patient is located in the following state in which I hold an active license [place]      Assessment/Plan:    Problem List Items Addressed This Visit     Abnormal glucose complicating pregnancy    Relevant Orders    Mychart glucose flowsheet    Maternal chronic hypertension, second trimester    Maternal morbid obesity in second trimester, antepartum ([address] )    Multigravida of advanced maternal age in second trimester    Obesity affecting pregnancy in second trimester   Other Visit Diagnoses     Diet controlled gestational diabetes mellitus (GDM) in third trimester    -  Primary    Relevant Orders    Mychart glucose flowsheet    30 weeks gestation of pregnancy        Weekly fetal testing is recommended starting at 32 weeks    Relevant Orders    Mychart glucose flowsheet               Reason for visit is   Chief Complaint   Patient presents with   • Virtual Regular Visit     [facility]; Link sent via Text Message   • Gestational Diabetes     30w1d   • Patient Education     Class 1; Group        Encounter provider Eileen Jon RD    Provider located at [address] [place] [id number] [phone]      Recent Visits  No visits were found meeting these conditions  Showing recent visits within past 7 days and meeting all other requirements  Today's Visits  Date Type Provider Dept   23 Telemedicine Eileen Jon, [facility] today's visits and meeting all other requirements  Future Appointments  No visits were found meeting these conditions  Showing future appointments within next 150 days and meeting all other requirements       The patient was identified by name and date of birth  Carlene Hinton was informed that this is a telemedicine visit and that the visit is being conducted through the [address] Now platform  She agrees to proceed     My office door was closed  No one else was in the room  She acknowledged consent and understanding of privacy and security of the video platform  The patient has agreed to participate and understands they can discontinue the visit at any time  Patient is aware this is a billable service  Subjective  Carlene Hinton is a 42 y o  female pregnant        HPI     Past Medical History:   Diagnosis Date   • Anxiety    • Herpes     hasn't had an outbreak in years   • Varicella     had chicken pox   • Von Willebrand disease        Past Surgical History:   Procedure Laterality Date   •  SECTION     • TONSILLECTOMY     • WISDOM TOOTH EXTRACTION         Current Outpatient Medications   Medication Sig Dispense Refill   • escitalopram (LEXAPRO) 5 mg tablet Take 5 mg by mouth daily     • labetalol (NORMODYNE) 100 mg tablet Take 0 5 tablets (50 mg total) by mouth 2 (two) times a day 180 tablet 1   • Prenatal Vit-DSS-Fe Fum-FA (Prenatal 19) tablet Take by mouth daily       No current facility-administered medications for this visit  No Known Allergies    Review of Systems   Unable to perform ROS: Other       Video Exam    There were no vitals filed for this visit  Physical Exam  Constitutional:       Appearance: Normal appearance  Comments: Limited Assessment r/t Virtual Visit   Neurological:      Mental Status: She is alert            I spent 62 minutes directly with the patient during this visit

## 2023-03-01 ENCOUNTER — DOCUMENTATION (OUTPATIENT)
Facility: HOSPITAL | Age: 38
End: 2023-03-01

## 2023-03-01 PROBLEM — O99.119: Status: ACTIVE | Noted: 2023-03-01

## 2023-03-01 NOTE — PROGRESS NOTES
Complex Mother Meeting:  Carlene Hinton was discussed in a multi-disciplinary conference this morning, with specialties in attendance including obstetrics, maternal-fetal medicine, anesthesiology, nursing, and neonatology  We discussed her antepartum, delivery, and postpartum care in detail  Recommendations are documented in her problem list under "Hematologic disorder in mother affecting pregnancy"    In summary, recommendations are as follows:    41 yo  30w3d with Von Willebrands disease, obesity BMI 44, AMA, chronic hypertension and palpitations treated with labetalol 50 mg BID  Heme consult performed on 1/3/23: Likely type 1 mild Diagnosed at age 19 due to menorrhagia  C/S in  with DDAVP afterwards and did well  OBGYN to order Factor 8, VWF labs at 36 weeks prior to delivery  (Saw Rah Obrien, [facility])  Treatment is DDAVP 150mcg in each nostril 2 hours prior to surgery  Afterwards, she will have twice weekly labs (CBC, VWF) for 6 weeks due to high risk period of postpartum hemorrhage  Tranexamic acid can be given empirically at time of  delivery  Last cardiology visit on : Continue labetalol 50 mg BID for palpitations and possible cHTN  No longer symptomatic  Last echo on , normal: Left ventricular cavity size is normal  Wall thickness is upper normal  The left ventricular ejection fraction is 60%  Systolic function is normal  Wall motion is normal  Diastolic function is normal  Holter monitor on 22 showed normal sinus rhythm with NSVT as well as PACs  Anesthesia consult performed on   Seeing MFM next on 3/13  Will start fetal surveillance at 32 weeks for chronic hypertension on medications

## 2023-03-02 ENCOUNTER — ROUTINE PRENATAL (OUTPATIENT)
Dept: OBGYN CLINIC | Facility: CLINIC | Age: 38
End: 2023-03-02

## 2023-03-02 VITALS — DIASTOLIC BLOOD PRESSURE: 80 MMHG | WEIGHT: 257 LBS | SYSTOLIC BLOOD PRESSURE: 130 MMHG | BODY MASS INDEX: 44.11 KG/M2

## 2023-03-02 DIAGNOSIS — Z67.91 RH NEGATIVE STATE IN ANTEPARTUM PERIOD, SECOND TRIMESTER: ICD-10-CM

## 2023-03-02 DIAGNOSIS — Z3A.30 30 WEEKS GESTATION OF PREGNANCY: ICD-10-CM

## 2023-03-02 DIAGNOSIS — Z23 NEED FOR TDAP VACCINATION: ICD-10-CM

## 2023-03-02 DIAGNOSIS — O10.912 MATERNAL CHRONIC HYPERTENSION, SECOND TRIMESTER: ICD-10-CM

## 2023-03-02 DIAGNOSIS — O26.892 RH NEGATIVE STATE IN ANTEPARTUM PERIOD, SECOND TRIMESTER: ICD-10-CM

## 2023-03-02 DIAGNOSIS — O99.810 ABNORMAL GLUCOSE COMPLICATING PREGNANCY: ICD-10-CM

## 2023-03-02 DIAGNOSIS — O09.522 MULTIGRAVIDA OF ADVANCED MATERNAL AGE IN SECOND TRIMESTER: ICD-10-CM

## 2023-03-02 DIAGNOSIS — O34.219 HISTORY OF CESAREAN SECTION COMPLICATING PREGNANCY: Primary | ICD-10-CM

## 2023-03-02 DIAGNOSIS — O99.119: ICD-10-CM

## 2023-03-02 NOTE — PROGRESS NOTES
This is a 42 y o   at 30w4d who presents for return OB visit  No complaints  Denies contractions, leakage, bleeding  Endorses fetal movement  BP: 130/80 TWG: -3lb   28 wk labs reviewed - failed 1 hr, elected to do FSBG instead of 3 hr, now following with DM Education and doing well   TDAP offered and accepted  Rhogam indicated and given today  [address] reviewed  Skin to skin, rooming in, delayed cord clamp,  pain management in labor discussed  Consent signed  Full code  OK with blood transfusion   Delivery planning recently discussed at complex mother meeting on 3/1 - please see M notes for details  Prior c/s x 1: repeat scheduled  8am with Dr Mitra Harris at pt's request  Contraception: pt desires tubal ligation at time of c/s  Understands this is permanent, irreversible, and that we are completely removing both fallopian tubes  Signed MA 31 papers today and pt given copy  Growth US at 32 weeks  To start weekly NSTs at that time  Pt having a boy, inquired about circumcision  Will follow up with [facility] peds so will see [facility] peds inpatient  Messaged Dr Olaf Plascencia today to see if there are options for circumcision given mom's history of VWD     F/up 2 wk

## 2023-03-02 NOTE — PROGRESS NOTES
Routine prenatal, 30 weeks, will receive tdap, breast pump, red folder, and Rhogam  Pt is well, states there are no concerns at this time  Pt has question regarding who can be in the room during delivery

## 2023-03-06 ENCOUNTER — TELEMEDICINE (OUTPATIENT)
Dept: PERINATAL CARE | Facility: CLINIC | Age: 38
End: 2023-03-06

## 2023-03-06 DIAGNOSIS — O99.213 OBESITY COMPLICATING PREGNANCY, THIRD TRIMESTER: ICD-10-CM

## 2023-03-06 DIAGNOSIS — O24.410 DIET CONTROLLED GESTATIONAL DIABETES MELLITUS (GDM) IN THIRD TRIMESTER: Primary | ICD-10-CM

## 2023-03-06 DIAGNOSIS — Z3A.31 31 WEEKS GESTATION OF PREGNANCY: ICD-10-CM

## 2023-03-06 NOTE — PROGRESS NOTES
Virtual Regular Visit    Verification of patient location:  ANIYAH Heard    Patient is located in the following state in which I hold an active license [place]      Assessment/Plan:    Problem List Items Addressed This Visit    None           Reason for visit is   Chief Complaint   Patient presents with   • Virtual Regular Visit        Encounter provider Nataliya Serrano    Provider located at [address] [facility]  [address] [facility] [id number] [phone]      Recent Visits  Date Type Provider Dept   23 [facility]   Showing recent visits within past 7 days and meeting all other requirements  Today's Visits  Date Type Provider Dept   23 [address] [place] [address]   Showing today's visits and meeting all other requirements  Future Appointments  No visits were found meeting these conditions  Showing future appointments within next 150 days and meeting all other requirements       The patient was identified by name and date of birth  Monika eMek was informed that this is a telemedicine visit and that the visit is being conducted through the [address] Now platform  She agrees to proceed     My office door was closed  No one else was in the room  She acknowledged consent and understanding of privacy and security of the video platform  The patient has agreed to participate and understands they can discontinue the visit at any time  Patient is aware this is a billable service  Subjective  Monika Meek is a 42 y o  female Pregnant patient        HPI     Past Medical History:   Diagnosis Date   • Anxiety    • Herpes     hasn't had an outbreak in years   • Varicella     had chicken pox   • Von Willebrand disease        Past Surgical History:   Procedure Laterality Date   •  SECTION     • TONSILLECTOMY     • WISDOM TOOTH EXTRACTION         Current Outpatient Medications   Medication Sig Dispense Refill   • escitalopram (LEXAPRO) 5 mg tablet Take 5 mg by mouth daily     • labetalol (NORMODYNE) 100 mg tablet Take 0 5 tablets (50 mg total) by mouth 2 (two) times a day 180 tablet 1   • Prenatal Vit-DSS-Fe Fum-FA (Prenatal 19) tablet Take by mouth daily       No current facility-administered medications for this visit  No Known Allergies    Review of Systems    Video Exam    There were no vitals filed for this visit  Physical Exam --not performed  I spent 60 minutes directly with the patient during this visit           Thank you for referring your patient to [facility] Maternal Fetal Medicine Diabetes in Pregnancy Program      Cordell Norton is a  42 y o  female who presents today for Individual i Class 2  Patient is at 31w1d gestation, Estimated Date of Delivery: 5/7/23  Reviewed and updated the following from patients medical record: PMH, Problem List, Allergies, and Current Medications  Visit Diagnosis:  Diet controlled GDM    Additional Pregnancy Complications:  Bariatric Surgery    Labs:    Lab Results   Component Value Date    [id number] 140 (H) 02/03/2023     Did not complete a 3 hour GTT test  Tested her BG for 1 week instead with some increased FBS & after meal results             No components found for: HGA1C    Medications:  No diabetes related medications    Anthropometrics:  Ht Readings from Last 3 Encounters:   02/10/23 5' 4" (1 626 m)   01/17/23 5' 4" (1 626 m)   01/03/23 5' 4" (1 626 m)     Wt Readings from Last 3 Encounters:   03/02/23 117 kg (257 lb)   02/10/23 115 kg (253 lb 12 8 oz)   02/02/23 114 kg (250 lb 6 4 oz)     Pre-gravid weight: 118 kg (260 lb)  Pre-gravid BMI: 44 61  Weight Change: -1 361 kg (-3 lb)  Weight gain recommendations: BMI (> 30) 11-20 lbs  Comments: Pateint needs to maintain her weight for the remainder of her pregnancy       Recent Ultra Sound Results:  Date:2/10/23  Fetal Growth:Normal  LON: Normal  Next US date: 3/13/23    Blood Glucose Monitoring:  Reinforcement of blood glucose goals and reporting guidelines  Glucose Meter: Care Sens from [employer]  Patient prefers to continue using this meter  Testing blood sugars: 4 x per day (Fasting, 2 hour after start of each meal)  Method of reporting blood sugars:Glucose Flowsheet    Report blood sugar results weekly via:  Phone: [phone]   OR  My Chart (Message with image attachment, or Glucose Flowsheet)    Goal Blood Sugar Ranges:   Fastin-90 mg/dL  1 hour after the start of each meal: 140 mg/dL or less  2 hours after start of each meal: 120 mg/dL or less      BG Log:  Review of blood glucose log  Discussed at Class 2 today  FBS is higher as skips bedtime snack nightly  Will begin bedtime snack to lower FBS  Meal Plan:  Calories: 2000 calorie (RVA:00-77-07-25-75-25) (PRO: 2/3--3/4-1-3/4-2)    Diet Type: Low Carbohydrate  Additional Nutrition concerns: Dislikes: salmon, oatmeal, refd beet eggs, juice, [place] food  Likes--cream of wheat  Mkes her own chocolate milk with Zero calorie chocolate syrup--dinks 1 cup daily   is [place] & eats some vegetarian meals made with beans  24 hr Diet Recall:  Provided at this class  Diet review: Diet recall indicates she is following the meal plan most of the time  Does need to add protein to some of her breakfast marisol cold cereal breakfast  Needs to decrease amount pasta from 2 cups to 1 cup at meals  Understands & agreed to comply  Diet Instruction:  The patient was instructed on the followin  Individualized meal plan  2  Importance of consistent carbohydrate intake via 3 meals and 3 snacks per day   3  Importance of protein as it relates to blood glucose control  4  Encouraged  patient to eat every 2 0-3 5 hours while awake  5  Encouraged patient to go no longer than 8-10 hours fasting overnight until first meal of the day    6  Provided suggested meal/snack options to increase nutrition and maintain consistent meal and snack intakes  Physical Activity:  Discussed benefits of physical activity to optimize blood glucose control, encouraged activity at patient is physically able  Always consult a physician prior to starting an exercise program  Recommend 20-30 minutes daily  Is patient physically active? Yes yoga 2-3 times weeksly  Sick day Guidelines:   Patient advised that sickness will raise blood sugar and need to continue medication regimen as directed  If blood sugar is > 160 mg/dL twice in one day call doctor  Instructed on what to do when unable to consume normal meal plan  Hypoglycemia & Treatment Guidelines:  Reviewed what hypoglycemia is, signs and symptoms, and how to treat via the 15:15 rule  Post-Partum Guidelines:  Completion of 75 gm CHO 2 hr gtt at 6 weeks post-partum to check for Type 2 DM diagnosis    Breastfeeding Guidelines:  Continue GDM meal plan plus additional 350-500 calories daily  Stay hydrated by drinking 8-10 (8 oz ) fluids daily  Examples of protein and carbohydrate snacks provided  Dining Out & Travel Guidelines:  Patient advised to be prepared with extra diabetes supplies, medications, and snacks, as well as sticking to the same time schedule and portions eaten at home for meals and snacks  Maternal-Fetal Testing:    Ultrasounds- growth scans every 4 weeks  NST- twice weekly starting at 32nd week GA   LON- weekly starting at 32 weeks GA    Patient Stated Goal: "I will eat 3 meals and 3 snacks each day, including protein at each"  Goal Assessment: Not on track    Diabetes Self Management Support Plan outside of ongoing care: Spouse/Family    Learner/s Present:Learners Present: Patient   Barriers to Learning/Change: Cultural and Financial  Expected Compliance: good    Date to report blood sugars: Weekly  Follow up date: As Needed  Begin Time: 8:30 AM  End Time: 9:30 AM    It was a pleasure working with them today   Please feel free to call with any questions or concerns      Sofiya Nunez  Diabetes Educator  [facility] Maternal Fetal Medicine  Diabetes in Pregnancy Program  [address] [facility], [address] [place] [address],[address]  [place], [address]

## 2023-03-13 ENCOUNTER — DOCUMENTATION (OUTPATIENT)
Dept: PERINATAL CARE | Facility: CLINIC | Age: 38
End: 2023-03-13

## 2023-03-13 ENCOUNTER — ULTRASOUND (OUTPATIENT)
Dept: PERINATAL CARE | Facility: OTHER | Age: 38
End: 2023-03-13

## 2023-03-13 VITALS
BODY MASS INDEX: 43.84 KG/M2 | HEART RATE: 92 BPM | SYSTOLIC BLOOD PRESSURE: 126 MMHG | WEIGHT: 256.8 LBS | DIASTOLIC BLOOD PRESSURE: 82 MMHG | HEIGHT: 64 IN

## 2023-03-13 DIAGNOSIS — E66.01 MATERNAL MORBID OBESITY IN SECOND TRIMESTER, ANTEPARTUM (HCC): ICD-10-CM

## 2023-03-13 DIAGNOSIS — O99.212 MATERNAL MORBID OBESITY IN SECOND TRIMESTER, ANTEPARTUM (HCC): ICD-10-CM

## 2023-03-13 DIAGNOSIS — O10.912 MATERNAL CHRONIC HYPERTENSION, SECOND TRIMESTER: ICD-10-CM

## 2023-03-13 DIAGNOSIS — Z36.2 ENCOUNTER FOR FOLLOW-UP ULTRASOUND OF FETAL ANATOMY: ICD-10-CM

## 2023-03-13 DIAGNOSIS — Z36.89 ENCOUNTER FOR ULTRASOUND TO CHECK FETAL GROWTH: ICD-10-CM

## 2023-03-13 DIAGNOSIS — O24.410 DIET CONTROLLED GESTATIONAL DIABETES MELLITUS (GDM) IN THIRD TRIMESTER: ICD-10-CM

## 2023-03-13 DIAGNOSIS — O99.119: ICD-10-CM

## 2023-03-13 DIAGNOSIS — Z3A.31 31 WEEKS GESTATION OF PREGNANCY: Primary | ICD-10-CM

## 2023-03-13 PROBLEM — O99.810 ABNORMAL GLUCOSE COMPLICATING PREGNANCY: Status: RESOLVED | Noted: 2023-02-10 | Resolved: 2023-03-13

## 2023-03-13 NOTE — PROGRESS NOTES
[address]: Ms Rachel Villalobos was seen today for fetal growth and followup missed anatomy ultrasound  See ultrasound report under "OB Procedures" tab  The time spent on this established patient on the encounter date included 5 minutes previsit service time reviewing records and precharting, 8 minutes face-to-face service time counseling regarding results and coordinating care, and  5 minutes charting, totalling 18 minutes    Please don't hesitate to contact our office with any concerns or questions   -Juliane Sears MD

## 2023-03-13 NOTE — PATIENT INSTRUCTIONS
Thank you for choosing us for your  care today  If you have any questions about your ultrasound or care, please do not hesitate to contact us or your primary obstetrician  Some general instructions for your pregnancy are:    Protect against coronavirus: get vaccinated - pregnant women are increased risk of severe COVID  Notify your primary care doctor if you have any symptoms  Exercise: Aim for 22 minutes per day (150 minutes per week) of regular exercise  Walking is great! Nutrition: aim for calcium-rich and iron-rich foods as well as healthy sources of protein  Learn about Preeclampsia: preeclampsia is a common, serious high blood pressure complication in pregnancy  A blood pressure of 312SLAD (systolic or top number) or 69OQPS (diastolic or bottom number) is not normal and needs evaluation by your doctor  Aspirin is sometimes prescribed in early pregnancy to prevent preeclampsia in women with risk factors - ask your obstetrician if you should be on this medication  If you smoke, try to reduce how many cigarettes you smoke or try to quit completely  Do not vape  Other warning signs to watch out for in pregnancy or postpartum: chest pain, obstructed breathing or shortness of breath, seizures, thoughts of hurting yourself or your baby, bleeding, a painful or swollen leg, fever, or headache (see [facility] Sign[facility])  If these happen call 911  Itching is also not normal in pregnancy and if you experience this, especially over your hands and feet, potentially worse at night, notify your doctors  Kick Counts in Pregnancy   AMBULATORY CARE:   Kick counts  measure how much your baby is moving in your womb  A kick from your baby can be felt as a twist, turn, swish, roll, or jab  It is common to feel your baby kicking at 26 to 28 weeks of pregnancy  You may feel your baby kick as early as 20 weeks of pregnancy  You may want to start counting at 28 weeks     Contact your doctor immediately if:   You feel a change in the number of kicks or movements of your baby  You feel fewer than 10 kicks within 2 hours  You have questions or concerns about your baby's movements  Why measure kick counts:  Your baby's movement may provide information about your baby's health  He or she may move less, or not at all, if there are problems  Your baby may move less if he or she is not getting enough oxygen or nutrition from the placenta  Do not smoke while you are pregnant  Smoking decreases the amount of oxygen that gets to your baby  Talk to your healthcare provider if you need help to quit smoking  Tell your healthcare provider as soon as you feel a change in your baby's movements  When to measure kick counts:   Measure kick counts at the same time every day  Measure kick counts when your baby is awake and most active  Your baby may be most active in the evening  How to measure kick counts:  Check that your baby is awake before you measure kick counts  You can wake up your baby by lightly pushing on your belly, walking, or drinking something cold  Your healthcare provider may tell you different ways to measure kick counts  You may be told to do the following:  Use a chart or clock to keep track of the time you start and finish counting  Sit in a chair or lie on your left side  Place your hands on the largest part of your belly  Count until you reach 10 kicks  Write down how much time it takes to count 10 kicks  It may take 30 minutes to 2 hours to count 10 kicks  It should not take more than 2 hours to count 10 kicks  Follow up with your doctor as directed:  Write down your questions so you remember to ask them during your visits  © Copyright [employer] 2022 Information is for End User's use only and may not be sold, redistributed or otherwise used for commercial purposes  The above information is an  only   It is not intended as medical advice for individual conditions or treatments  Talk to your doctor, nurse or pharmacist before following any medical regimen to see if it is safe and effective for you

## 2023-03-13 NOTE — LETTER
NST sleeve cover sheet    Patient name: Mary Cortes    N3G0  : 1985  MRN: [id number]    TERRANCE: Estimated Date of Delivery: 23    Obstetrician: __________________[facility]_____________    Reason(s) for testing:  ______________________________chtn on Med                                                               Pregest DM          Testing frequency:    _x__ 2x/wk  ___ 1x/wk  ___ Dopplers  ___ BPP?       Last growth scan: __________________________________________

## 2023-03-13 NOTE — PROGRESS NOTES
Repeat Non-Stress Testing:    Patient verbalizes +FM  Pt denies ALL:               Leaking of fluid   Contractions   Vaginal bleeding   Decreased fetal movement    Patient is performing daily kick counts  Patient has no questions or concerns  NST strip reviewed by Dr Gilberto Owusu

## 2023-03-13 NOTE — PROGRESS NOTES
Date: 03/13/23  Betty Anthony  1985  Estimated Date of Delivery: 5/7/23  [id number]  OB/GYN:[facility]    Diagnosis:  Diet controlled GDM    Blood Sugar Logs Submitted via: Glucose Flowsheet       Assessment and Plan:  No diabetes related medications  2000 calorie (CHO:45-15-60-15-60-30) (PRO: 2/3-1-3/4-1-3/4-2) meal plan consisting of 3 meals and 3 snacks daily including protein at each  Advised patient to continue current meal plan  Avoid fasting for > 10 hours overnight  Continue SMBG 4 x per day (Fasting, 2 hour after start of each meal) with a [employer] Sens glucose meter from [employer]  Does yoga 2-3 times weekly  Lab Work:   No results found for: HGBA1C     Ultrasound:       Date:3/13/23       Fetal Growth: AC->97% & EFW-88%       LON: Normal   Next: 4/11/23    Diabetes Self Management Support Plan outside of ongoing care: Spouse/Family   Patient Stated Goal: "I will eat 3 meals and 3 snacks each day, including protein at each"   Goal Assessment:  On track    Date to report next: Weekly     Tri Kate MS, RD, CDE  Diabetes Educator  [facility] Maternal Fetal Medicine  Diabetes in Pregnancy Program  [address] [place] [address], [address] [place] [address],[address]  [address]

## 2023-03-15 ENCOUNTER — ROUTINE PRENATAL (OUTPATIENT)
Dept: OBGYN CLINIC | Facility: CLINIC | Age: 38
End: 2023-03-15

## 2023-03-15 VITALS — SYSTOLIC BLOOD PRESSURE: 122 MMHG | BODY MASS INDEX: 44.25 KG/M2 | DIASTOLIC BLOOD PRESSURE: 80 MMHG | WEIGHT: 257.8 LBS

## 2023-03-15 DIAGNOSIS — Z3A.32 32 WEEKS GESTATION OF PREGNANCY: ICD-10-CM

## 2023-03-15 DIAGNOSIS — O99.212 MATERNAL MORBID OBESITY IN SECOND TRIMESTER, ANTEPARTUM (HCC): ICD-10-CM

## 2023-03-15 DIAGNOSIS — E66.01 MATERNAL MORBID OBESITY IN SECOND TRIMESTER, ANTEPARTUM (HCC): ICD-10-CM

## 2023-03-15 DIAGNOSIS — O24.410 DIET CONTROLLED GESTATIONAL DIABETES MELLITUS (GDM) IN THIRD TRIMESTER: ICD-10-CM

## 2023-03-15 DIAGNOSIS — O34.219 HISTORY OF CESAREAN SECTION COMPLICATING PREGNANCY: ICD-10-CM

## 2023-03-15 DIAGNOSIS — O99.119: Primary | ICD-10-CM

## 2023-03-15 PROBLEM — Z3A.30 30 WEEKS GESTATION OF PREGNANCY: Status: RESOLVED | Noted: 2022-11-01 | Resolved: 2023-03-15

## 2023-03-15 PROBLEM — O09.523 MULTIGRAVIDA OF ADVANCED MATERNAL AGE IN THIRD TRIMESTER: Status: ACTIVE | Noted: 2022-11-01

## 2023-03-15 NOTE — PROGRESS NOTES
Routine preantal, 32 weeks  Pt completed NST at [facility] on Monday 3/13/2023  Pt is well, concerns regarding delivery  Denies VB, LOF, and CTX   Urine dip test neg protein/neg glucose

## 2023-03-15 NOTE — PROGRESS NOTES
42 y o   female at [address] [facility] (Estimated Date of Delivery: 23) for PNV  Pre- Vitals    Flowsheet Row Most Recent Value   Prenatal Assessment    Fetal Heart Rate 145   Movement Present   Prenatal Vitals    Blood Pressure 122/80   Weight - Scale 117 kg (257 lb 12 8 oz)   Urine Albumin/Glucose    Dilation/Effacement/Station    Vaginal Drainage    Edema         TWG: -0 998 kg (-2 lb 3 2 oz)    Leakage of fluid: no  Vaginal bleeding: no  Contractions/Cramping: no  Fetal movement: yes  cHTN - controlled on labetalol 50mg bid   - weekly NST/LON performed at [facility] this week   - normotensive BP today  Prior c/s - RLTCS and bilateral salpingectomy scheduled for 11 with Dr Ruchi Gomez   - reviewed anticipated length of stay postoperatively  Patient is motivated to be discharged on POD 1  Also discussed use of sutures for skin closure with patient  She previously had staples for closure and would like to avoid those for this delivery  vWD - reviewed at Complex Mother's meeting in March  Plan for DDAVP 150mcg in each nostril 2 hours prior to surgery  Then twice weekly labs of CBC and vWF for 6 weeks to monitor for bleeding due to high risk of hemorrhage  Empiric TXA can be provided at time of  delivery to reduce risk as well  RTO in 2 weeks

## 2023-03-19 NOTE — PROGRESS NOTES
Please refer to the [facility] ultrasound report in Ob Procedures for additional information regarding today's visit

## 2023-03-20 ENCOUNTER — DOCUMENTATION (OUTPATIENT)
Dept: PERINATAL CARE | Facility: CLINIC | Age: 38
End: 2023-03-20

## 2023-03-20 NOTE — PROGRESS NOTES
Date: 03/20/23  Sheron Cruz  1985  Estimated Date of Delivery: 5/7/23  [id number]  OB/GYN:[facility]    Diagnosis:  Diet controlled GDM    Blood Sugar Logs Submitted via: Glucose Flowsheet             Assessment and Plan:  No diabetes related medications  2000 calorie (CHO:45-15-60-15-60-30) (PRO: 2/3-1-3/4-1-3/4-2) meal plan consisting of 3 meals and 3 snacks daily including protein at each  Advised patient to continue current meal plan  Avoid fasting for > 10 hours overnight  Continue SMBG 4 x per day (Fasting, 2 hour after start of each meal) with a [employer] Sens glucose meter from [employer]  Does yoga 2-3 times weekly  Lab Work:   No results found for: HGBA1C     Ultrasound:       Date:3/13/23       Fetal Growth: AC->97% & EFW-88%       LON: Normal   Next: 4/11/23    Diabetes Self Management Support Plan outside of ongoing care: Spouse/Family   Patient Stated Goal: "I will eat 3 meals and 3 snacks each day, including protein at each"   Goal Assessment:  On track    Date to report next: Weekly     Liz Metcalf, MS, RD, CDE  Diabetes Educator  [facility] Maternal Fetal Medicine  Diabetes in Pregnancy Program  [address] [place] [address], [address] [place] [address],[address]  [address]

## 2023-03-21 ENCOUNTER — ULTRASOUND (OUTPATIENT)
Dept: PERINATAL CARE | Facility: OTHER | Age: 38
End: 2023-03-21

## 2023-03-21 VITALS
WEIGHT: 259.8 LBS | HEART RATE: 88 BPM | DIASTOLIC BLOOD PRESSURE: 80 MMHG | HEIGHT: 64 IN | SYSTOLIC BLOOD PRESSURE: 126 MMHG | BODY MASS INDEX: 44.35 KG/M2

## 2023-03-21 DIAGNOSIS — Z3A.33 33 WEEKS GESTATION OF PREGNANCY: Primary | ICD-10-CM

## 2023-03-21 DIAGNOSIS — O10.913 MATERNAL CHRONIC HYPERTENSION, THIRD TRIMESTER: ICD-10-CM

## 2023-03-21 NOTE — LETTER
March 21, 2023     Herrera Mohr MD  [address]   [address]  [facility]    Patient: Yesi Santos   YOB: 1985   Date of Visit: 3/21/2023       Dear Dr Nani Hurtado:    Thank you for referring Yesi Santos to me for evaluation  Below are my notes for this consultation  If you have questions, please do not hesitate to call me  I look forward to following your patient along with you           Sincerely,        Jorge Blas MD        CC: No Recipients  Jorge Blas MD  3/19/2023  5:58 PM  Sign when Signing Visit  Please refer to the [facility] ultrasound report in Ob Procedures for additional information regarding today's visit
Yes

## 2023-03-21 NOTE — PROGRESS NOTES
NST was done today  Pt  Reported active fetal movement at home between visit  Daily fetal kick count reviewed and emphasized  Patient verbalized understanding of all and was receptive

## 2023-03-28 ENCOUNTER — DOCUMENTATION (OUTPATIENT)
Dept: PERINATAL CARE | Facility: CLINIC | Age: 38
End: 2023-03-28

## 2023-03-28 ENCOUNTER — ULTRASOUND (OUTPATIENT)
Dept: PERINATAL CARE | Facility: OTHER | Age: 38
End: 2023-03-28

## 2023-03-28 VITALS
SYSTOLIC BLOOD PRESSURE: 124 MMHG | HEIGHT: 64 IN | WEIGHT: 256.6 LBS | BODY MASS INDEX: 43.81 KG/M2 | DIASTOLIC BLOOD PRESSURE: 76 MMHG | HEART RATE: 110 BPM

## 2023-03-28 DIAGNOSIS — O10.913 MATERNAL CHRONIC HYPERTENSION IN THIRD TRIMESTER: ICD-10-CM

## 2023-03-28 DIAGNOSIS — E66.01 MATERNAL MORBID OBESITY IN THIRD TRIMESTER, ANTEPARTUM (HCC): Primary | ICD-10-CM

## 2023-03-28 DIAGNOSIS — Z3A.34 34 WEEKS GESTATION OF PREGNANCY: ICD-10-CM

## 2023-03-28 DIAGNOSIS — O99.213 MATERNAL MORBID OBESITY IN THIRD TRIMESTER, ANTEPARTUM (HCC): Primary | ICD-10-CM

## 2023-03-28 NOTE — PROGRESS NOTES
"  Date: 03/28/23  Mann Palafox  1985  Estimated Date of Delivery: 5/7/23  [id number]  OB/GYN:[facility]    Diagnosis:  Diet controlled GDM    Blood Sugar Logs Submitted via: Glucose Flowsheet         Assessment and Plan:  No diabetes related medications  2000 calorie (CHO:45-15-60-15-60-30) (PRO: 2/3-1-3/4-1-3/4-2) meal plan consisting of 3 meals and 3 snacks daily including protein at each  Advised patient to continue current meal plan  Avoid fasting for > 10 hours overnight  Continue SMBG 4 x per day (Fasting, 2 hour after start of each meal) with a Care Sens glucose meter from [employer]  Does yoga 2-3 times weekly  Lab Work:   No results found for: HGBA1C     Ultrasound:       Date:3/13/23       Fetal Growth: AC->97% & EFW-88%       LON: Normal   Next: 4/11/23    Diabetes Self Management Support Plan outside of ongoing care: Spouse/Family   Patient Stated Goal: \"I will eat 3 meals and 3 snacks each day, including protein at each\"   Goal Assessment:  On track    Date to report next: Weekly     David Vora, MS, RD, CDE  Diabetes Educator  [facility] Maternal Fetal Medicine  Diabetes in Pregnancy Program  [address]  [address]   "

## 2023-03-29 ENCOUNTER — ROUTINE PRENATAL (OUTPATIENT)
Dept: OBGYN CLINIC | Facility: CLINIC | Age: 38
End: 2023-03-29

## 2023-03-29 VITALS — SYSTOLIC BLOOD PRESSURE: 126 MMHG | BODY MASS INDEX: 43.94 KG/M2 | WEIGHT: 256 LBS | DIASTOLIC BLOOD PRESSURE: 80 MMHG

## 2023-03-29 DIAGNOSIS — D68.00 VON WILLEBRANDS DISEASE (HCC): ICD-10-CM

## 2023-03-29 DIAGNOSIS — O09.523 MULTIGRAVIDA OF ADVANCED MATERNAL AGE IN THIRD TRIMESTER: ICD-10-CM

## 2023-03-29 DIAGNOSIS — O24.410 DIET CONTROLLED GESTATIONAL DIABETES MELLITUS (GDM) IN THIRD TRIMESTER: Primary | ICD-10-CM

## 2023-03-29 DIAGNOSIS — Z3A.34 34 WEEKS GESTATION OF PREGNANCY: ICD-10-CM

## 2023-03-29 NOTE — PROGRESS NOTES
42 y o   female at 34w3d (Estimated Date of Delivery: 23) for PNV  Pre-Luc Vitals    Flowsheet Row Most Recent Value   Prenatal Assessment    Movement Present   Prenatal Vitals    Blood Pressure 126/80   Weight - Scale 116 kg (256 lb)   Urine Albumin/Glucose    Dilation/Effacement/Station    Vaginal Drainage    Edema         TWG: -1 814 kg (-4 lb)  chtn- taking labetalol 50mg PO bid, reports good bp at home  Feeling well on lexapro  A1GDM- following with diabetic ed    Weekly nst/ LON at  center  vWD- needs to repeat vwf and F8 at next visit  Scheduled for repeat c/s with BS

## 2023-04-03 ENCOUNTER — DOCUMENTATION (OUTPATIENT)
Dept: PERINATAL CARE | Facility: CLINIC | Age: 38
End: 2023-04-03

## 2023-04-03 NOTE — PROGRESS NOTES
"  Date: 04/03/23  Cathy Rouse  1985  Estimated Date of Delivery: 5/7/23  [id number]  OB/GYN:[facility]    Diagnosis:  Diet controlled GDM    Blood Sugar Logs Submitted via: Glucose Flowsheet         Assessment and Plan:  No diabetes related medications  2000 calorie (CHO:45-15-60-15-60-30) (PRO: 2/3-1-3/4-1-3/4-2) meal plan consisting of 3 meals and 3 snacks daily including protein at each  Advised patient to continue current meal plan  Avoid fasting for > 10 hours overnight  Continue SMBG 4 x per day (Fasting, 2 hour after start of each meal) with a [employer] Sens glucose meter from [employer]  Does yoga 2-3 times weekly  Lab Work:   No results found for: HGBA1C     Ultrasound:       Date:3/13/23       Fetal Growth: AC->97% & EFW-88%       LON: Normal   Next: 4/11/23    Diabetes Self Management Support Plan outside of ongoing care: Spouse/Family   Patient Stated Goal: \"I will eat 3 meals and 3 snacks each day, including protein at each\"   Goal Assessment:  On track    Date to report next: Weekly     Humble Ulrich, MS, RD, CDE  Diabetes Educator  [facility] Maternal Fetal Medicine  Diabetes in Pregnancy Program  [address] [facility], [address] [place] [address],[address]  [address]   "

## 2023-04-11 PROBLEM — O99.213 MATERNAL MORBID OBESITY IN THIRD TRIMESTER, ANTEPARTUM (HCC): Status: ACTIVE | Noted: 2022-12-23

## 2023-04-11 PROBLEM — Z3A.36 36 WEEKS GESTATION OF PREGNANCY: Status: ACTIVE | Noted: 2022-11-01

## 2023-04-11 PROBLEM — O10.913 MATERNAL CHRONIC HYPERTENSION, THIRD TRIMESTER: Status: ACTIVE | Noted: 2022-12-23

## 2023-04-19 ENCOUNTER — APPOINTMENT (OUTPATIENT)
Dept: LAB | Facility: CLINIC | Age: 38
End: 2023-04-19

## 2023-04-19 DIAGNOSIS — D68.00 VON WILLEBRAND DISEASE (HCC): ICD-10-CM

## 2023-04-22 LAB — FACT XIIIA PPP-ACNC: 108 % (ref 56–140)

## 2023-04-23 LAB — VWF:RCO ACT/NOR PPP PL AGG: 111 % (ref 50–200)

## 2023-04-24 ENCOUNTER — DOCUMENTATION (OUTPATIENT)
Dept: PERINATAL CARE | Facility: CLINIC | Age: 38
End: 2023-04-24

## 2023-04-24 NOTE — PROGRESS NOTES
"  Date: 23  Tyrone Matthews  1985  Estimated Date of Delivery: 23  [id number]  OB/GYN:[facility]    Diagnosis:  Diet controlled GDM    Blood Sugar Logs Submitted via: Glucose Flowsheet       Assessment and Plan:   scheduled for 23  No diabetes related medications  2000 calorie (CHO:45-15-60-15-60-30) (PRO: 3-1-3/4-1-3/4-2) meal plan consisting of 3 meals and 3 snacks daily including protein at each  Advised patient to continue current meal plan  Avoid fasting for > 10 hours overnight  Continue SMBG 4 x per day (Fasting, 2 hour after start of each meal) with a Care Sens glucose meter from [employer]  Does yoga 2-3 times weekly  Lab Work:   No results found for: HGBA1C     Ultrasound:       Date:23       Fetal Growth: AC->97% & EFW-79%--decreased from 88%       LON: Normal   Next: none    Diabetes Self Management Support Plan outside of ongoing care: Spouse/Family   Patient Stated Goal: \"I will eat 3 meals and 3 snacks each day, including protein at each\"   Goal Assessment:  On track    Date to report next: Weekly     Luann Buchanan, MS, RD, CDE  Diabetes Educator  [facility] Maternal Fetal Medicine  Diabetes in Pregnancy Program  [address] [place] [address],[address]  [address]   "

## 2023-04-25 ENCOUNTER — ULTRASOUND (OUTPATIENT)
Dept: PERINATAL CARE | Facility: OTHER | Age: 38
End: 2023-04-25

## 2023-04-25 VITALS
WEIGHT: 261.6 LBS | DIASTOLIC BLOOD PRESSURE: 78 MMHG | HEART RATE: 90 BPM | HEIGHT: 64 IN | BODY MASS INDEX: 44.66 KG/M2 | SYSTOLIC BLOOD PRESSURE: 118 MMHG

## 2023-04-25 DIAGNOSIS — O99.213 MATERNAL MORBID OBESITY IN THIRD TRIMESTER, ANTEPARTUM (HCC): Primary | ICD-10-CM

## 2023-04-25 DIAGNOSIS — Z3A.38 38 WEEKS GESTATION OF PREGNANCY: ICD-10-CM

## 2023-04-25 DIAGNOSIS — O10.913 MATERNAL CHRONIC HYPERTENSION, THIRD TRIMESTER: ICD-10-CM

## 2023-04-25 DIAGNOSIS — E66.01 MATERNAL MORBID OBESITY IN THIRD TRIMESTER, ANTEPARTUM (HCC): Primary | ICD-10-CM

## 2023-04-25 NOTE — LETTER
April 25, 2023     [facility], DO  [address]  [address]  [place] [address]    Patient: Orysia Duverney   YOB: 1985   Date of Visit: 4/25/2023       Dear Dr Gaby Stauffer:    Thank you for referring Orysia Duverney to me for evaluation  Below are my notes for this consultation  If you have questions, please do not hesitate to call me  I look forward to following your patient along with you           Sincerely,        Bridgette Pinzon MD        CC: No Recipients  Bridgette Pinzon MD  4/23/2023  4:11 PM  Sign when Signing Visit  Please refer to the [facility] ultrasound report in Ob Procedures for additional information regarding today's visit

## 2023-04-26 ENCOUNTER — ROUTINE PRENATAL (OUTPATIENT)
Dept: OBGYN CLINIC | Facility: CLINIC | Age: 38
End: 2023-04-26

## 2023-04-26 VITALS — BODY MASS INDEX: 44.59 KG/M2 | WEIGHT: 259.8 LBS | SYSTOLIC BLOOD PRESSURE: 138 MMHG | DIASTOLIC BLOOD PRESSURE: 80 MMHG

## 2023-04-26 DIAGNOSIS — O10.913 MATERNAL CHRONIC HYPERTENSION, THIRD TRIMESTER: Primary | ICD-10-CM

## 2023-04-26 DIAGNOSIS — O34.219 HISTORY OF CESAREAN SECTION COMPLICATING PREGNANCY: ICD-10-CM

## 2023-04-26 DIAGNOSIS — O09.523 MULTIGRAVIDA OF ADVANCED MATERNAL AGE IN THIRD TRIMESTER: ICD-10-CM

## 2023-04-26 DIAGNOSIS — O99.213 MATERNAL MORBID OBESITY IN THIRD TRIMESTER, ANTEPARTUM (HCC): ICD-10-CM

## 2023-04-26 DIAGNOSIS — E66.01 MATERNAL MORBID OBESITY IN THIRD TRIMESTER, ANTEPARTUM (HCC): ICD-10-CM

## 2023-04-26 DIAGNOSIS — Z3A.38 38 WEEKS GESTATION OF PREGNANCY: ICD-10-CM

## 2023-04-26 DIAGNOSIS — O24.410 DIET CONTROLLED GESTATIONAL DIABETES MELLITUS (GDM) IN THIRD TRIMESTER: ICD-10-CM

## 2023-04-26 NOTE — PROGRESS NOTES
42 y o   female at 38w3d (Estimated Date of Delivery: 23) for PNV  Pre-Luc Vitals    Flowsheet Row Most Recent Value   Prenatal Assessment    Fetal Heart Rate 140   Movement Present   Prenatal Vitals    Blood Pressure 138/80   Weight - Scale 118 kg (259 lb 12 8 oz)   Urine Albumin/Glucose    Dilation/Effacement/Station    Vaginal Drainage    Edema         TWG: -0 091 kg (-3 2 oz)    Leakage of fluid: no  Vaginal bleeding: no  Contractions/Cramping: no  Fetal movement: yes  cHTN - BP normotensive today  Currently controlled on labetalol 50mg bid  GDM - diet controlled  Following with diabetic education  RLTCS scheduled next week, Monday,  with Dr Naomy Olivares  Contraception - planning bilateral salpingectomy during  section   - hibiclens wash provided and instructions reviewed with patient  SVE deferred  GBS positive  RTO for postop incision check and postpartum visits

## 2023-04-28 NOTE — PRE-PROCEDURE INSTRUCTIONS
Phone call to patient for pre-operative instructions prior to     Pt was instructed to arrive at 0530 2 hours before her scheduled OR time 0800 (If the patient is RH negative, she should be told to come in 2 1/2 hours before scheduled OR)    Pt instructed to remain NPO after midnight  *This includes gum, water and hard candy  *If patient takes AM meds (e g hypertensive meds) they may take these meds with a sip of water  *This should not include medications like prenatal vitamins Aspirin or colace  *Type 1 diabetics should stay on their normal insulin regime or as ordered by their doctor  Type 2 on insulin should take 1/2 dose of their overnight insulin or as instructed by their doctor  Pt should brush their teeth as usual     Pt was instructed to buy and use a pre-surgical wash containing chlorhexidine the night before and the morning of her scheduled  and to wear clean clothing after the wash  Pt instructed not to shave operative area prior to surgery  Pt was asked not to wear any jewelry and to leave all of her valuables at home  Pt was asked to leave all of her larger bags and suitcases in the car to be brought in after she is assigned a post partum room  Pt was informed that she may have 1 support person in the OR/PACU area and of the current visiting policies

## 2023-04-29 ENCOUNTER — ANESTHESIA EVENT (INPATIENT)
Dept: LABOR AND DELIVERY | Facility: HOSPITAL | Age: 38
End: 2023-04-29

## 2023-05-01 ENCOUNTER — HOSPITAL ENCOUNTER (EMERGENCY)
Facility: HOSPITAL | Age: 38
Discharge: STILL ADMITTED OR ADMINISTRATIVE | End: 2023-05-01

## 2023-05-01 ENCOUNTER — HOSPITAL ENCOUNTER (INPATIENT)
Facility: HOSPITAL | Age: 38
LOS: 2 days | Discharge: HOME | End: 2023-05-03
Attending: OBSTETRICS & GYNECOLOGY | Admitting: OBSTETRICS & GYNECOLOGY

## 2023-05-01 ENCOUNTER — ANESTHESIA (INPATIENT)
Dept: LABOR AND DELIVERY | Facility: HOSPITAL | Age: 38
End: 2023-05-01

## 2023-05-01 DIAGNOSIS — Z30.2 REQUEST FOR STERILIZATION: ICD-10-CM

## 2023-05-01 DIAGNOSIS — Z3A.38 38 WEEKS GESTATION OF PREGNANCY: Primary | ICD-10-CM

## 2023-05-01 DIAGNOSIS — Z98.891 STATUS POST REPEAT LOW TRANSVERSE CESAREAN SECTION: ICD-10-CM

## 2023-05-01 DIAGNOSIS — O34.219 PREVIOUS CESAREAN SECTION COMPLICATING PREGNANCY: ICD-10-CM

## 2023-05-01 PROBLEM — Z3A.39 39 WEEKS GESTATION OF PREGNANCY: Status: ACTIVE | Noted: 2022-11-01

## 2023-05-01 LAB
ABO GROUP BLD: NORMAL
ALBUMIN SERPL BCP-MCNC: 3.7 G/DL (ref 3.5–5)
ALP SERPL-CCNC: 167 U/L (ref 34–104)
ALT SERPL W P-5'-P-CCNC: 34 U/L (ref 7–52)
ANION GAP SERPL CALCULATED.3IONS-SCNC: 9 MMOL/L (ref 4–13)
AST SERPL W P-5'-P-CCNC: 15 U/L (ref 13–39)
BASE EXCESS BLDCOA CALC-SCNC: -3.5 MMOL/L (ref 3–11)
BASE EXCESS BLDCOV CALC-SCNC: -4 MMOL/L (ref 1–9)
BILIRUB SERPL-MCNC: 0.41 MG/DL (ref 0.2–1)
BLD GP AB SCN SERPL QL: POSITIVE
BLOOD GROUP ANTIBODIES SERPL: NORMAL
BUN SERPL-MCNC: 6 MG/DL (ref 5–25)
CALCIUM SERPL-MCNC: 9.2 MG/DL (ref 8.4–10.2)
CHLORIDE SERPL-SCNC: 107 MMOL/L (ref 96–108)
CO2 SERPL-SCNC: 20 MMOL/L (ref 21–32)
CREAT SERPL-MCNC: 0.49 MG/DL (ref 0.6–1.3)
ERYTHROCYTE [DISTWIDTH] IN BLOOD BY AUTOMATED COUNT: 13.2 % (ref 11.6–15.1)
GFR SERPL CREATININE-BSD FRML MDRD: 122 ML/MIN/1.73SQ M
GLUCOSE SERPL-MCNC: 84 MG/DL (ref 65–140)
GLUCOSE SERPL-MCNC: 93 MG/DL (ref 65–140)
HCO3 BLDCOA-SCNC: 25 MMOL/L (ref 17.3–27.3)
HCO3 BLDCOV-SCNC: 22 MMOL/L (ref 12.2–28.6)
HCT VFR BLD AUTO: 40.1 % (ref 34.8–46.1)
HGB BLD-MCNC: 13.6 G/DL (ref 11.5–15.4)
HOLD SPECIMEN: NORMAL
MCH RBC QN AUTO: 32.2 PG (ref 26.8–34.3)
MCHC RBC AUTO-ENTMCNC: 33.9 G/DL (ref 31.4–37.4)
MCV RBC AUTO: 95 FL (ref 82–98)
O2 CT VFR BLDCOA CALC: 3.5 ML/DL
OXYHGB MFR BLDCOA: 17.4 %
OXYHGB MFR BLDCOV: 59.7 %
PCO2 BLDCOA: 59.8 MM[HG] (ref 30–60)
PCO2 BLDCOV: 43.2 MM HG (ref 27–43)
PH BLDCOA: 7.24 [PH] (ref 7.23–7.43)
PH BLDCOV: 7.32 [PH] (ref 7.19–7.49)
PLATELET # BLD AUTO: 246 THOUSANDS/UL (ref 149–390)
PMV BLD AUTO: 8.9 FL (ref 8.9–12.7)
PO2 BLDCOA: 11.3 MM HG (ref 5–25)
PO2 BLDCOV: 24.2 MM HG (ref 15–45)
POTASSIUM SERPL-SCNC: 3.8 MMOL/L (ref 3.5–5.3)
PROT SERPL-MCNC: 7 G/DL (ref 6.4–8.4)
RBC # BLD AUTO: 4.23 MILLION/UL (ref 3.81–5.12)
RH BLD: NEGATIVE
SAO2 % BLDCOV: 12.4 ML/DL
SODIUM SERPL-SCNC: 136 MMOL/L (ref 135–147)
SPECIMEN EXPIRATION DATE: NORMAL
TREPONEMA PALLIDUM IGG+IGM AB [PRESENCE] IN SERUM OR PLASMA BY IMMUNOASSAY: NORMAL
WBC # BLD AUTO: 10.28 THOUSAND/UL (ref 4.31–10.16)

## 2023-05-01 PROCEDURE — 4A1HXCZ MONITORING OF PRODUCTS OF CONCEPTION, CARDIAC RATE, EXTERNAL APPROACH: ICD-10-PCS | Performed by: OBSTETRICS & GYNECOLOGY

## 2023-05-01 PROCEDURE — 0UB70ZZ EXCISION OF BILATERAL FALLOPIAN TUBES, OPEN APPROACH: ICD-10-PCS | Performed by: OBSTETRICS & GYNECOLOGY

## 2023-05-01 RX ORDER — OXYCODONE HYDROCHLORIDE 10 MG/1
10 TABLET ORAL EVERY 4 HOURS PRN
Status: DISCONTINUED | OUTPATIENT
Start: 2023-05-02 | End: 2023-05-03 | Stop reason: HOSPADM

## 2023-05-01 RX ORDER — DIAPER,BRIEF,INFANT-TODD,DISP
1 EACH MISCELLANEOUS DAILY PRN
Status: DISCONTINUED | OUTPATIENT
Start: 2023-05-01 | End: 2023-05-03 | Stop reason: HOSPADM

## 2023-05-01 RX ORDER — CALCIUM CARBONATE 200(500)MG
1000 TABLET,CHEWABLE ORAL DAILY PRN
Status: DISCONTINUED | OUTPATIENT
Start: 2023-05-01 | End: 2023-05-03 | Stop reason: HOSPADM

## 2023-05-01 RX ORDER — BUPIVACAINE HYDROCHLORIDE 7.5 MG/ML
INJECTION, SOLUTION INTRASPINAL AS NEEDED
Status: DISCONTINUED | OUTPATIENT
Start: 2023-05-01 | End: 2023-05-01

## 2023-05-01 RX ORDER — ESCITALOPRAM OXALATE 5 MG/1
5 TABLET ORAL DAILY
Status: DISCONTINUED | OUTPATIENT
Start: 2023-05-01 | End: 2023-05-03 | Stop reason: HOSPADM

## 2023-05-01 RX ORDER — CEFAZOLIN SODIUM 2 G/50ML
2000 SOLUTION INTRAVENOUS ONCE
Status: COMPLETED | OUTPATIENT
Start: 2023-05-01 | End: 2023-05-01

## 2023-05-01 RX ORDER — IBUPROFEN 600 MG/1
600 TABLET ORAL EVERY 6 HOURS SCHEDULED
Status: DISCONTINUED | OUTPATIENT
Start: 2023-05-01 | End: 2023-05-03 | Stop reason: HOSPADM

## 2023-05-01 RX ORDER — LABETALOL 100 MG/1
50 TABLET, FILM COATED ORAL EVERY 12 HOURS SCHEDULED
Status: DISCONTINUED | OUTPATIENT
Start: 2023-05-01 | End: 2023-05-03 | Stop reason: HOSPADM

## 2023-05-01 RX ORDER — OXYCODONE HYDROCHLORIDE 5 MG/1
5 TABLET ORAL EVERY 4 HOURS PRN
Status: DISCONTINUED | OUTPATIENT
Start: 2023-05-02 | End: 2023-05-03 | Stop reason: HOSPADM

## 2023-05-01 RX ORDER — OXYTOCIN/RINGER'S LACTATE 30/500 ML
62.5 PLASTIC BAG, INJECTION (ML) INTRAVENOUS ONCE
Status: COMPLETED | OUTPATIENT
Start: 2023-05-01 | End: 2023-05-01

## 2023-05-01 RX ORDER — LABETALOL 100 MG/1
50 TABLET, FILM COATED ORAL 2 TIMES DAILY
Status: DISCONTINUED | OUTPATIENT
Start: 2023-05-01 | End: 2023-05-01

## 2023-05-01 RX ORDER — ACETAMINOPHEN 325 MG/1
975 TABLET ORAL EVERY 8 HOURS SCHEDULED
Status: DISCONTINUED | OUTPATIENT
Start: 2023-05-01 | End: 2023-05-03

## 2023-05-01 RX ORDER — NALOXONE HYDROCHLORIDE 0.4 MG/ML
0.1 INJECTION, SOLUTION INTRAMUSCULAR; INTRAVENOUS; SUBCUTANEOUS
Status: ACTIVE | OUTPATIENT
Start: 2023-05-01 | End: 2023-05-02

## 2023-05-01 RX ORDER — ONDANSETRON 2 MG/ML
4 INJECTION INTRAMUSCULAR; INTRAVENOUS EVERY 8 HOURS PRN
Status: DISCONTINUED | OUTPATIENT
Start: 2023-05-01 | End: 2023-05-01 | Stop reason: SDUPTHER

## 2023-05-01 RX ORDER — DOCUSATE SODIUM 100 MG/1
100 CAPSULE, LIQUID FILLED ORAL 2 TIMES DAILY
Status: DISCONTINUED | OUTPATIENT
Start: 2023-05-01 | End: 2023-05-03 | Stop reason: HOSPADM

## 2023-05-01 RX ORDER — SODIUM CHLORIDE, SODIUM LACTATE, POTASSIUM CHLORIDE, CALCIUM CHLORIDE 600; 310; 30; 20 MG/100ML; MG/100ML; MG/100ML; MG/100ML
125 INJECTION, SOLUTION INTRAVENOUS CONTINUOUS
Status: DISCONTINUED | OUTPATIENT
Start: 2023-05-01 | End: 2023-05-01

## 2023-05-01 RX ORDER — DESMOPRESSIN ACETATE 0.1 MG/ML
150 SOLUTION NASAL ONCE
Status: COMPLETED | OUTPATIENT
Start: 2023-05-01 | End: 2023-05-01

## 2023-05-01 RX ORDER — HYDROMORPHONE HCL/PF 1 MG/ML
0.5 SYRINGE (ML) INJECTION EVERY 2 HOUR PRN
Status: DISCONTINUED | OUTPATIENT
Start: 2023-05-01 | End: 2023-05-03 | Stop reason: HOSPADM

## 2023-05-01 RX ORDER — ONDANSETRON 2 MG/ML
4 INJECTION INTRAMUSCULAR; INTRAVENOUS EVERY 6 HOURS PRN
Status: ACTIVE | OUTPATIENT
Start: 2023-05-01 | End: 2023-05-02

## 2023-05-01 RX ORDER — EPHEDRINE SULFATE 50 MG/ML
INJECTION INTRAVENOUS AS NEEDED
Status: DISCONTINUED | OUTPATIENT
Start: 2023-05-01 | End: 2023-05-01

## 2023-05-01 RX ORDER — DEXAMETHASONE SODIUM PHOSPHATE 10 MG/ML
INJECTION, SOLUTION INTRAMUSCULAR; INTRAVENOUS AS NEEDED
Status: DISCONTINUED | OUTPATIENT
Start: 2023-05-01 | End: 2023-05-01

## 2023-05-01 RX ORDER — PHENYLEPHRINE HCL IN 0.9% NACL 1 MG/10 ML
SYRINGE (ML) INTRAVENOUS AS NEEDED
Status: DISCONTINUED | OUTPATIENT
Start: 2023-05-01 | End: 2023-05-01

## 2023-05-01 RX ORDER — MORPHINE SULFATE 0.5 MG/ML
INJECTION, SOLUTION EPIDURAL; INTRATHECAL; INTRAVENOUS AS NEEDED
Status: DISCONTINUED | OUTPATIENT
Start: 2023-05-01 | End: 2023-05-01

## 2023-05-01 RX ORDER — ONDANSETRON 2 MG/ML
4 INJECTION INTRAMUSCULAR; INTRAVENOUS EVERY 8 HOURS PRN
Status: DISCONTINUED | OUTPATIENT
Start: 2023-05-01 | End: 2023-05-01

## 2023-05-01 RX ORDER — DIPHENHYDRAMINE HYDROCHLORIDE 50 MG/ML
25 INJECTION INTRAMUSCULAR; INTRAVENOUS EVERY 6 HOURS PRN
Status: DISCONTINUED | OUTPATIENT
Start: 2023-05-01 | End: 2023-05-03 | Stop reason: HOSPADM

## 2023-05-01 RX ORDER — TRANEXAMIC ACID 10 MG/ML
INJECTION, SOLUTION INTRAVENOUS AS NEEDED
Status: DISCONTINUED | OUTPATIENT
Start: 2023-05-01 | End: 2023-05-01

## 2023-05-01 RX ORDER — FENTANYL CITRATE/PF 50 MCG/ML
50 SYRINGE (ML) INJECTION
Status: DISCONTINUED | OUTPATIENT
Start: 2023-05-01 | End: 2023-05-03 | Stop reason: HOSPADM

## 2023-05-01 RX ORDER — OXYTOCIN/RINGER'S LACTATE 30/500 ML
PLASTIC BAG, INJECTION (ML) INTRAVENOUS CONTINUOUS PRN
Status: DISCONTINUED | OUTPATIENT
Start: 2023-05-01 | End: 2023-05-01

## 2023-05-01 RX ORDER — FENTANYL CITRATE 50 UG/ML
INJECTION, SOLUTION INTRAMUSCULAR; INTRAVENOUS AS NEEDED
Status: DISCONTINUED | OUTPATIENT
Start: 2023-05-01 | End: 2023-05-01

## 2023-05-01 RX ORDER — ONDANSETRON 2 MG/ML
INJECTION INTRAMUSCULAR; INTRAVENOUS AS NEEDED
Status: DISCONTINUED | OUTPATIENT
Start: 2023-05-01 | End: 2023-05-01

## 2023-05-01 RX ORDER — ESCITALOPRAM OXALATE 5 MG/1
5 TABLET ORAL DAILY
Status: DISCONTINUED | OUTPATIENT
Start: 2023-05-01 | End: 2023-05-01

## 2023-05-01 RX ORDER — NALBUPHINE HCL 10 MG/ML
3 AMPUL (ML) INJECTION
Status: ACTIVE | OUTPATIENT
Start: 2023-05-01 | End: 2023-05-02

## 2023-05-01 RX ADMIN — SODIUM CHLORIDE, SODIUM LACTATE, POTASSIUM CHLORIDE, AND CALCIUM CHLORIDE 125 ML/HR: .6; .31; .03; .02 INJECTION, SOLUTION INTRAVENOUS at 07:57

## 2023-05-01 RX ADMIN — SODIUM CHLORIDE, SODIUM LACTATE, POTASSIUM CHLORIDE, AND CALCIUM CHLORIDE 125 ML/HR: .6; .31; .03; .02 INJECTION, SOLUTION INTRAVENOUS at 06:50

## 2023-05-01 RX ADMIN — ACETAMINOPHEN 975 MG: 325 TABLET ORAL at 14:57

## 2023-05-01 RX ADMIN — ACETAMINOPHEN 975 MG: 325 TABLET ORAL at 23:14

## 2023-05-01 RX ADMIN — CEFAZOLIN SODIUM 2000 MG: 2 SOLUTION INTRAVENOUS at 08:24

## 2023-05-01 RX ADMIN — MORPHINE SULFATE 0.15 MG: 0.5 INJECTION, SOLUTION EPIDURAL; INTRATHECAL; INTRAVENOUS at 08:22

## 2023-05-01 RX ADMIN — IBUPROFEN 600 MG: 600 TABLET, FILM COATED ORAL at 19:28

## 2023-05-01 RX ADMIN — SODIUM CHLORIDE, SODIUM LACTATE, POTASSIUM CHLORIDE, AND CALCIUM CHLORIDE 125 ML/HR: .6; .31; .03; .02 INJECTION, SOLUTION INTRAVENOUS at 10:50

## 2023-05-01 RX ADMIN — EPHEDRINE SULFATE 10 MG: 50 INJECTION, SOLUTION INTRAVENOUS at 08:32

## 2023-05-01 RX ADMIN — DESMOPRESSIN ACETATE 150 MCG: 10 SPRAY NASAL at 06:41

## 2023-05-01 RX ADMIN — DOCUSATE SODIUM 100 MG: 100 CAPSULE, LIQUID FILLED ORAL at 19:28

## 2023-05-01 RX ADMIN — DEXAMETHASONE SODIUM PHOSPHATE 10 MG: 10 INJECTION, SOLUTION INTRAMUSCULAR; INTRAVENOUS at 08:49

## 2023-05-01 RX ADMIN — ONDANSETRON 4 MG: 2 INJECTION INTRAMUSCULAR; INTRAVENOUS at 08:49

## 2023-05-01 RX ADMIN — Medication 100 MCG: at 08:32

## 2023-05-01 RX ADMIN — ESCITALOPRAM 5 MG: 5 TABLET, FILM COATED ORAL at 20:32

## 2023-05-01 RX ADMIN — Medication 500 MILLI-UNITS/MIN: at 08:46

## 2023-05-01 RX ADMIN — BUPIVACAINE HYDROCHLORIDE IN DEXTROSE 1.6 ML: 7.5 INJECTION, SOLUTION SUBARACHNOID at 08:22

## 2023-05-01 RX ADMIN — Medication 62.5 MILLI-UNITS/MIN: at 10:14

## 2023-05-01 RX ADMIN — TRANEXAMIC ACID 1000 MG: 10 INJECTION, SOLUTION INTRAVENOUS at 08:47

## 2023-05-01 RX ADMIN — PHENYLEPHRINE HYDROCHLORIDE 50 MCG/MIN: 10 INJECTION, SOLUTION INTRAVENOUS at 08:23

## 2023-05-01 RX ADMIN — FENTANYL CITRATE 15 MCG: 50 INJECTION INTRAMUSCULAR; INTRAVENOUS at 08:22

## 2023-05-01 NOTE — PLAN OF CARE
Problem: POSTPARTUM  Goal: Experiences normal postpartum course  Description: INTERVENTIONS:  - Monitor maternal vital signs  - Assess uterine involution and lochia  Outcome: Progressing  Goal: Appropriate maternal -  bonding  Description: INTERVENTIONS:  - Identify family support  - Assess for appropriate maternal/infant bonding   -Encourage maternal/infant bonding opportunities  - Referral to  or  as needed  Outcome: Progressing  Goal: Establishment of infant feeding pattern  Description: INTERVENTIONS:  - Assess breast/bottle feeding  - Refer to lactation as needed  Outcome: Progressing  Goal: Incision(s), wounds(s) or drain site(s) healing without S/S of infection  Description: INTERVENTIONS  - Assess and document dressing, incision, wound bed, drain sites and surrounding tissue  - Provide patient and family education  - Perform skin care/dressing changes   Outcome: Progressing     Problem: BIRTH - VAGINAL/ SECTION  Goal: Fetal and maternal status remain reassuring during the birth process  Description: INTERVENTIONS:  - Monitor vital signs  - Monitor fetal heart rate  - Monitor uterine activity    - Antibiotic prophylaxis  Outcome: Completed  Goal: Emotionally satisfying birthing experience for mother/fetus  Description: Interventions:  - Assess, plan, implement and evaluate the nursing care given to the patient in labor  - Advocate the philosophy that each childbirth experience is a unique experience and support the family's chosen level of involvement and control during the labor process   - Actively participate in both the patient's and family's teaching of the birth process  - Consider cultural, Alevism and age-specific factors and plan care for the patient in labor  Outcome: Completed     Problem: ANTEPARTUM  Goal: Maintain pregnancy as long as maternal and/or fetal condition is stable  Description: INTERVENTIONS:  - Maternal surveillance  - Fetal surveillance  - Monitor uterine activity  - Medications as ordered  - Bedrest  Outcome: Completed

## 2023-05-01 NOTE — ASSESSMENT & PLAN NOTE
Admit   T&S, CBC, RPR  NPO  IV fluids  Ancef 2g ordered  For RLTCS  Postpartum contraception: Desires permanent sterilization  31 signed 3/2/2023

## 2023-05-01 NOTE — DISCHARGE SUMMARY
Discharge Summary - OB/GYN   Korin Jauregui 34 y o  female MRN: [id number]  Unit/Bed#: LD PACU- Encounter: [id number]      Admission Date: 2023     Discharge Date: 5/3/2023    Admitting Diagnosis:   1  Pregnancy at 39w1d  2  Rh negative  3  GBS positive  4  Chronic hypertension  5  Von Willebrand's disease    Discharge Diagnosis:   Same, delivered      Procedures: repeat  section, low transverse incision and Bilateral salpingectomy    Delivering Attending: Steve Durán DO  Discharge Attending: Dr Yulisa Rodriguez DO    Hospital Course:     Korin Jauregui is a 34 y o  Juanitae Speller  at 39w1d wks who was initially admitted for repeat lower transverse  section  She delivered a viable male  on 2023 at [id number]  Weight 8lbs 1oz via repeat  section, low transverse incision  Apgars were 8 (1 min) and 9 (5 min)   was transferred to  nursery  Patient tolerated the procedure well and was transferred to recovery in stable condition  Her post-operative course was complicated by maternal chronic hypertension managed on labetalol 50 mg BID  She did not have any severe range blood pressures postpartum  She has a history of von Willebrand's disease  She received DDAVP preoperatively  After discussion with hematology, she did not receive any Lovenox for DVT prophylaxis postpartum  DVT prophylaxis was with SCDs and increased ambulation on the postpartum floor    Preoperative hemaglobin was 13 6, postoperative was 10 9  Her postoperative pain was well controlled with oral analgesics  Maternal blood type Rh-  RhoGAM panel collected postpartum  Baby's blood type B negative  RhoGAM was not indicated  On day of discharge, she was ambulating and able to reasonably perform all ADLs  She was voiding and had appropriate bowel function  Pain was well controlled  She was discharged home on post-operative day #2 without complications   Patient was instructed to follow up with her OB as an outpatient and was given appropriate warnings to call provider if she develops signs of infection or uncontrolled pain  Complications: none apparent    Condition at discharge: good     Discharge instructions/Information to patient and family:   See after visit summary for information provided to patient and family  Provisions for Follow-Up Care:  See after visit summary for information related to follow-up care and any pertinent home health orders  Disposition: See After Visit Summary for discharge disposition information  Planned Readmission: No    Discharge Medications: For a complete list of the patient's medications, please refer to her med rec

## 2023-05-01 NOTE — PLAN OF CARE
Problem: BIRTH - VAGINAL/ SECTION  Goal: Fetal and maternal status remain reassuring during the birth process  Description: INTERVENTIONS:  - Monitor vital signs  - Monitor fetal heart rate  - Monitor uterine activity    - Antibiotic prophylaxis  Outcome: Progressing     Problem: BIRTH - VAGINAL/ SECTION  Goal: Emotionally satisfying birthing experience for mother/fetus  Description: Interventions:  - Assess, plan, implement and evaluate the nursing care given to the patient in labor  - Advocate the philosophy that each childbirth experience is a unique experience and support the family's chosen level of involvement and control during the labor process   - Actively participate in both the patient's and family's teaching of the birth process  - Consider cultural, Adventist and age-specific factors and plan care for the patient in labor  Outcome: Progressing

## 2023-05-01 NOTE — ASSESSMENT & PLAN NOTE
-Seeing hematology who recommended treatment is DDAVP 150mcg in each nostril 2 hours prior to surgery  Afterwards, she will have twice weekly labs (CBC, VWF) for 6 weeks due to high risk period of postpartum hemorrhage  Recommend providing Tranexamic acid if bleeding occurred  - received DDAVP prior to C/S  - Heme/Onc messaged on POD#0 regarding anticoagulation with Lovenox  Per protocol, pt would receive Lovenox 40mg BID starting on POD#1  Heme/Onc AP recommended repeat vWF to assist in determining anticoagulation  VWF activity was collected on POD#0 and is in process  - After discussion with hematology on POD#1, no plan for anticoagulation postoperatively at this time   - Encouraged SCD use and ambulation

## 2023-05-01 NOTE — OP NOTE
OPERATIVE REPORT  PATIENT NAME: Sheron Palma    :  1985  MRN: [id number]  Pt Location: [facility] L&D OR ROOM 02    SURGERY DATE: 2023    Surgeon(s) and Role:     * Michaelle Noland,  - Primary     * Varsha Smith DO - Assisting    Preop Diagnosis:  Previous  section complicating pregnancy [Q12 264]  Request for sterilization [Z30 2]    Post-Op Diagnosis Codes:     * Previous  section complicating pregnancy [L02 500]     * Request for sterilization [Z30 2]    Procedure(s) (LRB):   SECTION () REPEAT (N/A)  LIGATION/COAGULATION TUBAL (Bilateral)    Specimen(s):  ID Type Source Tests Collected by Time Destination   1 : PRN Tissue Fallopian Tube, Left TISSUE EXAM Michaelle Noland, DO [id number]    2 : PRN Tissue Fallopian Tube, Right TISSUE EXAM Michaelle Noland, DO 8085 [id number]    A :  Cord Blood Cord BLOOD GAS, VENOUS, CORD, BLOOD GAS, ARTERIAL, CORD [facility] Noland, DO [phone]    B :  Tissue (Placenta on Hold) OB Only Placenta PLACENTA IN [employer], [place] 3/7/4802 1208        Surgical QBL:  Surgical QBL (mL): 436 mL      Drains:  Urethral Catheter Asept; Double-lumen;Non-latex;Straight-tip 1 Fr   (Active)   Goal for Removal Voiding trial when ambulation improves 23 0825   Site Assessment Clean;Skin intact 23 0825   Collection Container Standard drainage bag 23 0825   Number of days: 0     Anesthesia Type: Spinal    Operative Indications:  Previous  section complicating pregnancy [F38 366]  Request for sterilization [Z30 2]     Lonnie Group Classification System:  No Multiple pregnancy, No Transverse or oblique lie, No Breech lie, Gestational age is > or =37 weeks, Multiparous, Previous uterine scar +  is LONNIE GROUP 5     Section Procedure Note    Indications:   Maternal request for repeat  section    Pre-operative Diagnosis:   39w1d pregnancy  Rh negative  GBS positive  Chronic hypertension  Von Willebrand's disease    Post-operative Diagnosis:   RLTCS   S/p bilateral salpingectomy    Attending: Shaun Gonzalez DO  Resident: Erinn Crowell DO    Maternal Findings:  Normal uterus  Normal tubes and ovaries bilaterally  Small adhesion noted from the distal aspect of right tube to the right ovary  No difficulty noted from skin to delivery     Findings:  Viable male weighing 8lbs 0 9 oz;  Apgar scores of 8 at one minute and 9 at five minutes  Clear amniotic fluid  Normal placenta with 3-vessel cord    Arterial and Venous Gases:  Umbilical Cord Venous Blood Gas:  Results from last 7 days   Lab Units 23  0847   PH COV  7 324   PCO2 COV mm HG 43 2*   HCO3 COV mmol/L 22 0   BASE EXC COV mmol/L -4 0*   O2 CT CD VB mL/dL 12 4   O2 HGB, VENOUS CORD % 37 3     Umbilical Cord Arterial Blood Gas:  Results from last 7 days   Lab Units 23  0847   PH COA  7 239   PCO2 COA  59 8   PO2 COA mm HG 11 3   HCO3 COA mmol/L 25 0   BASE EXC COA mmol/L -3 5*   O2 CONTENT CORD ART ml/dl 3 5   O2 HGB, ARTERIAL CORD % 17 4       Specimens: Arterial and venous cord gases, cord blood, segment of umbilical cord, placenta to storage, tubes to pathology    Quantitative Blood Loss: 436 mL    Fluids: 1 L LR    Drains: James catheter           Complications:  None apparent           Disposition: PACU            Condition: stable    Procedure Details   The patient was seen prior to the procedure  Risks, benefits, possible complications, alternate treatment options, and expected outcomes were discussed with the patient  The patient agreed with the proposed plan and gave informed consent for a repeat low transverse  section  The patient was taken to the [facility] Operating Room at 2387 where she received spinal anesthesia  For infection prophylaxis, she received 2g ancef  preoperatively   Fetal heart tones in the OR were assessed and noted to be within normal limits and a James catheter and SCDs were placed  The abdomen was prepped with Chloraprep, the vagina was prepped with Chlorhexidine, and following appropriate drying time, the patient was draped in the usual sterile manner  A Time Out was held and the above information confirmed  The patient was identified as April Loza and the procedure verified as a  Delivery for repeat low transverse  section  A Pfannenstiel incision was made and carried down through the underlying subcutaneous tissue to the fascia using a scalpel  The rectus fascia was then nicked in the midline and dissected laterally using Gamboa scissors  The superior edge of the  fascial incision was grasped with Kocher clamps bilaterally, tented upward and the underlying rectus muscles were dissected off sharply with Gamboa scissors  This was repeated on the inferior edge of the fascia and dissected down to the pubic rami  The rectus muscles were  and the peritoneum was identified, entered, and extended longitudinally with blunt dissection  The bladder blade was inserted  A low transverse uterine incision was made with the scalpel and extended laterally with blunt dissection  The amnion was entered bluntly  The fetal head was palpated, elevated, and delivered through the uterine incision followed by the body without difficulty  Time of birth was noted at 12  A loose nuchal cord was noted 2 and reduced manually  There was noted to be spontaneous cry and good tone  There was no apparent injury to the   The umbilical cord was doubly clamped and cut after 30 seconds to allow for delayed cord clamping  The infant was handed off to the  providers  Arterial and venous cord gases, cord blood, and a segment of umbilical cord were obtained for evaluation  The placenta delivered spontaneously at 2806 with uterine fundal massage and appeared normal  The uterus was exteriorized and cleaned out with a moist lap sponge  The uterine incision was closed with a running locked suture of 0 Vicryl  A second layer of the same suture was used to imbricate the first   Hemostasis was noted to be excellent  Attention was then turned to adnexa  The left fallopian tube was grasped at its fimbriated end with a nabeel and elevated to visualize the mesosalpinx  The Enseal device was used to ligate along the mesosalpinx, working proximally and taking care to avoid ovarian vasculature  Approximately 2cm from the cornua, the Enseal device was used to amputate fallopian tube  Specimen was sent to pathology  Attention was then turned to the contralateral tube  There was a small adhesion noted from the distal aspect of right tube to the right ovary that was dissected with Bovie electrocautery  The Enseal device was then us to amputate the right fallopian tube similar fashion  Specimen was sent to pathology good hemostasis was confirmed following salpingectomy  electrocautery was used to ligate along the mesosalpinx, working proximally and taking care to avoid ovarian vasculature  Approximately 2cm from the cornua, electrocautery was used to amputate fallopian tube  Plain gut suture was used to secure the pedicles  Specimen was sent for pathology  Attention was then turned to the contralateral tube, which was amputated in similar fashion  Good hemostasis was confirmed following salpingectomy  The uterus was returned to the abdomen  The paracolic gutters were inspected and cleared of all clots and debris with irrigation and moist lap sponges  The fascia was closed with a running suture of 0 Vicryl  Subcutaneous adipose tissue was closed with an interrupted suture of 2-0 Plain gut  The skin was closed with a subcuticular running suture of 4-0 Monocryl  A Mepilex dressing was applied  The patient appeared to tolerate the procedure very well  Lap sponge, needle, and instrument counts were correct x2    The patient's fundus was palpated and the uterus was expressed  She was then cleaned and transferred to her postpartum recovery room in stable condition and her infant went to the  nursery  Attending Attestation: Dr Laureano Stock DO was present for the entire procedure      Catarina Hendricks DO  OB/GYN PGY-1  2023 9:57 AM

## 2023-05-01 NOTE — ASSESSMENT & PLAN NOTE
Recent Labs     05/01/23  0756   POCGLU 84       Blood Sugar Average: Last 72 hrs:    Not requiring insulin

## 2023-05-01 NOTE — H&P
[address] Glover 34 y o  female MRN: [id number]  Unit/Bed#: LD TRIAGE 1- Encounter: [id number]    Assessment: 34 y o   at 39w1d admitted for RLTCS  FHT: Cat 1  Clinical EFW: 79% ; Cephalic confirmed by TAUS  GBS status: Positive      Plan:   Diet controlled gestational diabetes mellitus (GDM) in third trimester  Assessment & Plan      No results for input(s): POCGLU in the last 72 hours  Blood Sugar Average: Last 72 hrs:    Not requiring insulin    Maternal chronic hypertension, third trimester  Assessment & Plan  Systolic (56MRV), CKH:055 , Min:120 , HPF:283   Diastolic (85JVR), UOO:64, Min:62, Max:76    Currently managed on Labetalol 50mg BID    Rh negative state in antepartum period, second trimester  Assessment & Plan  RhoGAM panel ordered postpartum  She received RhoGAM at 28 weeks    [facility] disease  Assessment & Plan  -Seeing hematology who recommended treatment is DDAVP 150mcg in each nostril 2 hours prior to surgery  Afterwards, she will have twice weekly labs (CBC, VWF) for 6 weeks due to high risk period of postpartum hemorrhage  Recommend providing Tranexamic acid if bleeding occurred  -DDAVP 150mcg ordered      * 39 weeks gestation of pregnancy  Assessment & Plan  Admit   T&S, CBC, RPR  NPO  IV fluids  Ancef 2g ordered  For RLTCS  Postpartum contraception: Desires permanent sterilization  31 signed 3/2/2023    Discussed case and plan w/ Dr Annabelle Farmer    Chief Complaint: Here for     HPI: Merline Basque is a 34 y o  Jonna Humphreys with an TERRANCE of 2023, by Last Menstrual Period at 36w3d who is being admitted for repeat low-transverse  section  She reports irregular contractions, has no LOF, and reports no VB  She states she has felt good FM      Patient Active Problem List   Diagnosis    Generalized anxiety disorder    Panic disorder    Von Willebrands disease    Multigravida of advanced maternal age in third trimester    Obesity affecting pregnancy in second trimester    39 weeks gestation of pregnancy    Rh negative state in antepartum period, second trimester    Maternal chronic hypertension, third trimester    Maternal morbid obesity in third trimester, antepartum ([facility] )    COVID-19 affecting pregnancy in second trimester    Hematologic disorder in mother affecting pregnancy    History of  section complicating pregnancy    Diet controlled gestational diabetes mellitus (GDM) in third trimester     Baby complications/comments: none    Review of Systems   Constitutional: Negative for chills and fever  HENT: Negative for ear pain and sore throat  Eyes: Negative for pain and visual disturbance  Respiratory: Negative for cough and shortness of breath  Cardiovascular: Negative for chest pain and palpitations  Gastrointestinal: Negative for abdominal pain and vomiting  Genitourinary: Negative for dysuria and hematuria  Musculoskeletal: Negative for arthralgias and back pain  Skin: Negative for color change and rash  Neurological: Negative for seizures and syncope  All other systems reviewed and are negative        OB Hx:  OB History    Para Term  AB Living   3 1 1   1 1   SAB IAB Ectopic Multiple Live Births   1       1      # Outcome Date GA Lbr Terrence/2nd Weight Sex Delivery Anes PTL Lv   3 Current            2 SAB 2021           1 Term 16 41w0d  3799 g (8 lb 6 oz) F CS-Unspec Gen  DARLENE       Past Medical Hx:  Past Medical History:   Diagnosis Date    Anxiety     Diabetes mellitus ([facility] )     a1gdm    Herpes     hasn't had an outbreak in years    Hypertension     Varicella     had chicken pox    Von Willebrand disease ([facility] )        Past Surgical hx:  Past Surgical History:   Procedure Laterality Date     SECTION      TONSILLECTOMY      WISDOM TOOTH EXTRACTION         Social Hx:  Alcohol use: denies  Tobacco use: denies  Other substance use: denies    No Known Allergies    Medications Prior to Admission   Medication    escitalopram (LEXAPRO) 5 mg tablet    labetalol (NORMODYNE) 100 mg tablet    Prenatal Vit-DSS-Fe Fum-FA (Prenatal 19) tablet       Objective:  Temp:  [98 7 °F (37 1 °C)] 98 7 °F (37 1 °C)  HR:  [74-78] 78  Resp:  [20] 20  BP: (120-140)/(62-76) 133/71  Body mass index is 44 59 kg/m²  Physical Exam:  Physical Exam  Constitutional:       General: She is not in acute distress  Appearance: Normal appearance  She is not ill-appearing or toxic-appearing  HENT:      Head: Normocephalic and atraumatic  Right Ear: External ear normal       Left Ear: External ear normal       Nose: Nose normal       Mouth/Throat:      Pharynx: Oropharynx is clear  Eyes:      General: Lids are normal       Extraocular Movements: Extraocular movements intact  Conjunctiva/sclera: Conjunctivae normal    Cardiovascular:      Rate and Rhythm: Normal rate and regular rhythm  Pulses: Normal pulses  Heart sounds: Normal heart sounds  No murmur heard  Pulmonary:      Effort: Pulmonary effort is normal  No respiratory distress  Abdominal:      Palpations: Abdomen is soft  Tenderness: There is no abdominal tenderness  Comments: gravid   Musculoskeletal:      Cervical back: Full passive range of motion without pain and normal range of motion  Right lower leg: No edema  Left lower leg: No edema  Right foot: Normal range of motion  Left foot: Normal range of motion  Feet:      Right foot:      Skin integrity: Warmth and dry skin present  Neurological:      General: No focal deficit present  Mental Status: She is alert  Motor: No weakness  Skin:     General: Skin is warm and dry  Psychiatric:         Mood and Affect: Mood normal          Judgment: Judgment normal    Vitals reviewed  Exam conducted with a chaperone present          FHT:Baseline Rate: 140 bpm  Variability: Moderate 6-25 bpm  Accelerations: 15 x 15 or greater  Decelerations: None  FHR Category: Category I    TOCO:   Contraction Frequency (minutes): irregular  Contraction Quality: Mild    Lab Results   Component Value Date    WBC 10 28 (H) 05/01/2023    HGB 13 6 05/01/2023    HCT 40 1 05/01/2023     05/01/2023     Lab Results   Component Value Date    K 4 0 12/09/2022     12/09/2022    CO2 23 12/09/2022    BUN 5 12/09/2022    CREATININE 0 45 (L) 12/09/2022    AST 15 12/09/2022    ALT 58 (H) 12/09/2022     Prenatal Labs: Reviewed      Blood type: O negative  Antibody: Negative (2/3/23)  GBS: Positive  HIV: Nonreactive  Rubella: Immune  Syphilis IgM/IgG: Nonreactive  HBsAg: Negative  HCAb: Negative  Chlamydia: Negative  Gonorrhea: Negative  Diabetes 1 hour screen: Normal  Platelets: [phone]  Hgb: 11 9  >2 Midnights  INPATIENT     Signature/Title: Carmencita Cunningham DO  Date: 5/1/2023  Time: 8:04 AM

## 2023-05-01 NOTE — ANESTHESIA POSTPROCEDURE EVALUATION
Post-Op Assessment Note    CV Status:  Stable  Pain Score: 0    Pain management: adequate     Mental Status:  Alert and awake   Hydration Status:  Euvolemic   PONV Controlled:  Controlled   Airway Patency:  Patent      Post Op Vitals Reviewed: Yes      Staff: CRNA         There were no known notable events for this encounter      /55 (05/01/23 0940)    Temp 98 4 °F (36 9 °C) (05/01/23 0940)    Pulse 78 (05/01/23 0940)   Resp 14 (05/01/23 0940)    SpO2 94 % (05/01/23 0940)

## 2023-05-01 NOTE — ANESTHESIA PROCEDURE NOTES
Spinal Block    Patient location during procedure: OB  Start time: 5/1/2023 8:22 AM  Staffing  Performed: Anesthesiologist   Anesthesiologist: Myles Topete MD  Resident/CRNA: Grady Terrazas CRNA  Preanesthetic Checklist  Completed: patient identified, IV checked, site marked, risks and benefits discussed, surgical consent, monitors and equipment checked, pre-op evaluation and timeout performed  Spinal Block  Patient position: sitting  Prep: ChloraPrep  Patient monitoring: heart rate, continuous pulse ox and frequent blood pressure checks  Approach: midline  Location: L3-4  Injection technique: single-shot  Needle  Needle type: pencil-tip   Needle gauge: 24 G  Needle length: 10 cm  Assessment  Sensory level: T4  Injection Assessment:  negative aspiration for heme, no paresthesia on injection and positive aspiration for clear CSF    Post-procedure:  site cleaned

## 2023-05-01 NOTE — PLAN OF CARE
Problem: POSTPARTUM  Goal: Experiences normal postpartum course  Description: INTERVENTIONS:  - Monitor maternal vital signs  - Assess uterine involution and lochia  Outcome: Progressing  Goal: Appropriate maternal -  bonding  Description: INTERVENTIONS:  - Identify family support  - Assess for appropriate maternal/infant bonding   -Encourage maternal/infant bonding opportunities  - Referral to  or  as needed  Outcome: Progressing  Goal: Establishment of infant feeding pattern  Description: INTERVENTIONS:  - Assess breast/bottle feeding  - Refer to lactation as needed  Outcome: Progressing  Goal: Incision(s), wounds(s) or drain site(s) healing without S/S of infection  Description: INTERVENTIONS  - Assess and document dressing, incision, wound bed, drain sites and surrounding tissue  - Provide patient and family education  - Perform skin care/dressing changes every   Outcome: Progressing     Problem: PAIN - ADULT  Goal: Verbalizes/displays adequate comfort level or baseline comfort level  Description: Interventions:  - Encourage patient to monitor pain and request assistance  - Assess pain using appropriate pain scale  - Administer analgesics based on type and severity of pain and evaluate response  - Implement non-pharmacological measures as appropriate and evaluate response  - Consider cultural and social influences on pain and pain management  - Notify physician/advanced practitioner if interventions unsuccessful or patient reports new pain  Outcome: Progressing     Problem: INFECTION - ADULT  Goal: Absence or prevention of progression during hospitalization  Description: INTERVENTIONS:  - Assess and monitor for signs and symptoms of infection  - Monitor lab/diagnostic results  - Monitor all insertion sites, i e  indwelling lines, tubes, and drains  - Monitor endotracheal if appropriate and nasal secretions for changes in amount and color  - [place] appropriate cooling/warming therapies per order  - Administer medications as ordered  - Instruct and encourage patient and family to use good hand hygiene technique  - Identify and instruct in appropriate isolation precautions for identified infection/condition  Outcome: Progressing     Problem: SAFETY ADULT  Goal: Patient will remain free of falls  Description: INTERVENTIONS:  - Educate patient/family on patient safety including physical limitations  - Instruct patient to call for assistance with activity   - Consult OT/PT to assist with strengthening/mobility   - Keep Call bell within reach  - Keep bed low and locked with side rails adjusted as appropriate  - Keep care items and personal belongings within reach  - Initiate and maintain comfort rounds  - Make Fall Risk Sign visible to staff  - Offer Toileting every  Hours, in advance of need  - Initiate/Maintain alarm  - Obtain necessary fall risk management equipment:  - Apply yellow socks and bracelet for high fall risk patients  - Consider moving patient to room near nurses station  Outcome: Progressing     Problem: Knowledge Deficit  Goal: Patient/family/caregiver demonstrates understanding of disease process, treatment plan, medications, and discharge instructions  Description: Complete learning assessment and assess knowledge base    Interventions:  - Provide teaching at level of understanding  - Provide teaching via preferred learning methods  Outcome: Progressing     Problem: DISCHARGE PLANNING  Goal: Discharge to home or other facility with appropriate resources  Description: INTERVENTIONS:  - Identify barriers to discharge w/patient and caregiver  - Arrange for needed discharge resources and transportation as appropriate  - Identify discharge learning needs (meds, wound care, etc )  - Arrange for interpretive services to assist at discharge as needed  - Refer to Case Management Department for coordinating discharge planning if the patient needs post-hospital services based on physician/advanced practitioner order or complex needs related to functional status, cognitive ability, or social support system  Outcome: Progressing

## 2023-05-01 NOTE — ANESTHESIA PREPROCEDURE EVALUATION
"Procedure:   SECTION () REPEAT (Uterus)  LIGATION/COAGULATION TUBAL (Bilateral: Abdomen)    Relevant Problems   CARDIO   (+) Maternal chronic hypertension, third trimester      GYN   (+) 39 weeks gestation of pregnancy   (+) Multigravida of advanced maternal age in third trimester      HEMATOLOGY   (+) Raiza Root disease      NEURO/PSYCH   (+) Generalized anxiety disorder   (+) Panic disorder        Physical Exam    Airway    Mallampati score: III  TM Distance: >3 FB  Neck ROM: full     Dental       Cardiovascular      Pulmonary      Other Findings        Anesthesia Plan  ASA Score- 3     Anesthesia Type- spinal with ASA Monitors  Additional Monitors:   Airway Plan:     Comment: Patient has vWF deficiency type 1 mild  According to most recent literature and high risk OB visit with Drake Henry MD, plan is to proceed with spinal anesthetic with close post-op observation  Patient to receive DDAVP pre-op and TXA post-delivery  Patient understands risks and agrees  Note 2023 Shemar:  Marleny aGrcia has a history of mild vWD diagnosed at age 19 during a workup for menorrhagia  She had a previous c/s under general anesthesia due to her history at an OSH however workup during this pregnancy reveals a very mild form of vWD and she is a candidate for spinal anesthesia for her upcoming c/s  I agree with hematology's recommendations for administration of DDAVP 2 hours prior to delivery and a dose of TXA after delivery  No further workup is required as long as her clinical picture does not change  \"  Plan Factors-Exercise tolerance (METS): >4 METS  Chart reviewed  Induction-     Postoperative Plan-     Informed Consent- Anesthetic plan and risks discussed with patient  I personally reviewed this patient with the CRNA  Discussed and agreed on the Anesthesia Plan with the CRNA  Anton Elizondo             "

## 2023-05-02 PROBLEM — Z98.891 STATUS POST REPEAT LOW TRANSVERSE CESAREAN SECTION: Status: ACTIVE | Noted: 2023-05-02

## 2023-05-02 PROBLEM — Z90.79 STATUS POST BILATERAL SALPINGECTOMY: Status: ACTIVE | Noted: 2023-05-02

## 2023-05-02 LAB
BASOPHILS # BLD AUTO: 0.04 THOUSANDS/ÂΜL (ref 0–0.1)
BASOPHILS NFR BLD AUTO: 0 % (ref 0–1)
EOSINOPHIL # BLD AUTO: 0.14 THOUSAND/ÂΜL (ref 0–0.61)
EOSINOPHIL NFR BLD AUTO: 1 % (ref 0–6)
ERYTHROCYTE [DISTWIDTH] IN BLOOD BY AUTOMATED COUNT: 13.4 % (ref 11.6–15.1)
HCT VFR BLD AUTO: 32.8 % (ref 34.8–46.1)
HGB BLD-MCNC: 10.9 G/DL (ref 11.5–15.4)
IMM GRANULOCYTES # BLD AUTO: 0.11 THOUSAND/UL (ref 0–0.2)
IMM GRANULOCYTES NFR BLD AUTO: 1 % (ref 0–2)
LYMPHOCYTES # BLD AUTO: 2.03 THOUSANDS/ÂΜL (ref 0.6–4.47)
LYMPHOCYTES NFR BLD AUTO: 20 % (ref 14–44)
MCH RBC QN AUTO: 32 PG (ref 26.8–34.3)
MCHC RBC AUTO-ENTMCNC: 33.2 G/DL (ref 31.4–37.4)
MCV RBC AUTO: 96 FL (ref 82–98)
MONOCYTES # BLD AUTO: 0.59 THOUSAND/ÂΜL (ref 0.17–1.22)
MONOCYTES NFR BLD AUTO: 6 % (ref 4–12)
NEUTROPHILS # BLD AUTO: 7.48 THOUSANDS/ÂΜL (ref 1.85–7.62)
NEUTS SEG NFR BLD AUTO: 72 % (ref 43–75)
NRBC BLD AUTO-RTO: 0 /100 WBCS
PLATELET # BLD AUTO: 196 THOUSANDS/UL (ref 149–390)
PMV BLD AUTO: 8.9 FL (ref 8.9–12.7)
RBC # BLD AUTO: 3.41 MILLION/UL (ref 3.81–5.12)
WBC # BLD AUTO: 10.39 THOUSAND/UL (ref 4.31–10.16)

## 2023-05-02 RX ORDER — SIMETHICONE 80 MG
80 TABLET,CHEWABLE ORAL EVERY 6 HOURS PRN
Status: DISCONTINUED | OUTPATIENT
Start: 2023-05-02 | End: 2023-05-03 | Stop reason: HOSPADM

## 2023-05-02 RX ADMIN — OXYCODONE HYDROCHLORIDE 5 MG: 5 TABLET ORAL at 23:49

## 2023-05-02 RX ADMIN — IBUPROFEN 600 MG: 600 TABLET, FILM COATED ORAL at 02:07

## 2023-05-02 RX ADMIN — SIMETHICONE 80 MG: 80 TABLET, CHEWABLE ORAL at 15:27

## 2023-05-02 RX ADMIN — ESCITALOPRAM 5 MG: 5 TABLET, FILM COATED ORAL at 18:46

## 2023-05-02 RX ADMIN — IBUPROFEN 600 MG: 600 TABLET, FILM COATED ORAL at 22:06

## 2023-05-02 RX ADMIN — DOCUSATE SODIUM 100 MG: 100 CAPSULE, LIQUID FILLED ORAL at 08:40

## 2023-05-02 RX ADMIN — IBUPROFEN 600 MG: 600 TABLET, FILM COATED ORAL at 08:40

## 2023-05-02 RX ADMIN — IBUPROFEN 600 MG: 600 TABLET, FILM COATED ORAL at 15:35

## 2023-05-02 RX ADMIN — ACETAMINOPHEN 975 MG: 325 TABLET ORAL at 07:38

## 2023-05-02 RX ADMIN — OXYCODONE HYDROCHLORIDE 5 MG: 5 TABLET ORAL at 12:20

## 2023-05-02 RX ADMIN — ACETAMINOPHEN 975 MG: 325 TABLET ORAL at 18:28

## 2023-05-02 RX ADMIN — DOCUSATE SODIUM 100 MG: 100 CAPSULE, LIQUID FILLED ORAL at 18:28

## 2023-05-02 RX ADMIN — SIMETHICONE 80 MG: 80 TABLET, CHEWABLE ORAL at 22:06

## 2023-05-02 NOTE — PROGRESS NOTES
Progress Note - OB/GYN  Lexus Lorenzo 34 y o  female MRN: [id number]  Unit/Bed#: -01 Encounter: [id number]    Assessment and Plan     Lexus Lorenzo is a patient of: [facility]  She is PPD# 1 s/p  repeat  section, low transverse incision  Recovering well and is stable     Status post bilateral salpingectomy  Assessment & Plan  Request for permanent sterilization  S/p bilateral salpingectomy at time of C/S    Status post repeat low transverse  section  Assessment & Plan  , Hgb 13 6 --> post op Hgb in process  S/p James, due for VT x1 this AM  Pain: Tylenol and toradol scheduled, ena 5/10 PRN    FEN: Tolerating regular diet  DVT ppx: SCDs and ambulation  Further discussion with heme/onc regarding medication for anticoagulation  Passing flatus   Incision C/D/I         Diet controlled gestational diabetes mellitus (GDM) in third trimester  Assessment & Plan      Recent Labs     23  0756   POCGLU 84       Blood Sugar Average: Last 72 hrs:    Not requiring insulin    Maternal chronic hypertension, third trimester  Assessment & Plan  Systolic (73EXY), AYU:964 , Min:109 , MTP:237   Diastolic (34KBC), QQM:95, Min:57, Max:71    Currently managed on Labetalol 50mg BID    Rh negative state in antepartum period, second trimester  Assessment & Plan  RhoGAM panel ordered postpartum  She received RhoGAM at 28 weeks    [facility] disease  Assessment & Plan  -Seeing hematology who recommended treatment is DDAVP 150mcg in each nostril 2 hours prior to surgery  Afterwards, she will have twice weekly labs (CBC, VWF) for 6 weeks due to high risk period of postpartum hemorrhage  Recommend providing Tranexamic acid if bleeding occurred  - received DDAVP prior to C/S  - Heme/Onc messaged on POD#0 regarding anticoagulation with Lovenox  Per protocol, pt would receive Lovenox 40mg BID starting on POD#1  Heme/Onc AP recommended repeat vWF to assist in determining anticoagulation  "VWF activity was collected on POD#0 and is in process        Disposition    - Anticipate discharge home on PPD# 2-3      Subjective/Objective     Chief Complaint: Postpartum State     Subjective:    Loren Ghosh is PPD/POD#1 s/p  repeat  section, low transverse incision  She has no current complaints  Pain is well controlled  Patient is currently voiding  She is ambulating  Patient is currently passing flatus and has had no bowel movement  She is tolerating PO, and denies nausea or vomitting  Patient denies fever, chills, chest pain, shortness of breath, or calf tenderness  Lochia is normal  She is  Breastfeeding  She is recovering well and is stable       Vitals:   /71 (BP Location: Left arm)   Pulse 69   Temp 98 2 °F (36 8 °C) (Oral)   Resp 18   Ht 5' 4\" (1 626 m)   Wt 118 kg (259 lb 12 8 oz)   LMP 2022 (Exact Date)   SpO2 98%   Breastfeeding Yes   BMI 44 59 kg/m²       Intake/Output Summary (Last 24 hours) at 2023 0610  Last data filed at 2023 0501  Gross per 24 hour   Intake 1145 29 ml   Output 1336 ml   Net -190 71 ml       Invasive Devices     Peripheral Intravenous Line  Duration           Peripheral IV 23 Distal;Left;Upper;Ventral (anterior) Antecubital <1 day                Physical Exam:   GEN: Loren Ghosh appears well, alert and oriented x 3, pleasant and cooperative   CARDIO: RRR, no murmurs or rubs  RESP:  CTAB, no wheezes or rales  ABDOMEN: soft, no tenderness, no distention, fundus @ u-1, Incision C/D/I  EXTREMITIES: SCDs on, non tender, no erythema, b/l [place]'s sign negative      Labs:     Hemoglobin   Date Value Ref Range Status   2023 13 6 11 5 - 15 4 g/dL Final   2023 11 9 11 5 - 15 4 g/dL Final     WBC   Date Value Ref Range Status   2023 10 28 (H) 4 31 - 10 16 Thousand/uL Final   2023 10 83 (H) 4 31 - 10 16 Thousand/uL Final     Platelets   Date Value Ref Range Status   2023 246 149 - 390 Thousands/uL Final " 02/03/2023 257 149 - 390 Thousands/uL Final     Creatinine   Date Value Ref Range Status   05/01/2023 0 49 (L) 0 60 - 1 30 mg/dL Final     Comment:     Standardized to IDMS reference method   12/09/2022 0 45 (L) 0 60 - 1 30 mg/dL Final     Comment:     Standardized to IDMS reference method     AST   Date Value Ref Range Status   05/01/2023 15 13 - 39 U/L Final   12/09/2022 15 13 - 39 U/L Final     Comment:     Specimen collection should occur prior to Sulfasalazine administration due to the potential for falsely depressed results  ALT   Date Value Ref Range Status   05/01/2023 34 7 - 52 U/L Final     Comment:     Specimen collection should occur prior to Sulfasalazine administration due to the potential for falsely depressed results  12/09/2022 58 (H) 7 - 52 U/L Final     Comment:     Specimen collection should occur prior to Sulfasalazine administration due to the potential for falsely depressed results             Nya Ye DO  5/2/2023  6:10 AM

## 2023-05-02 NOTE — PLAN OF CARE
Problem: POSTPARTUM  Goal: Experiences normal postpartum course  Description: INTERVENTIONS:  - Monitor maternal vital signs  - Assess uterine involution and lochia  Outcome: Progressing  Goal: Appropriate maternal -  bonding  Description: INTERVENTIONS:  - Identify family support  - Assess for appropriate maternal/infant bonding   -Encourage maternal/infant bonding opportunities  - Referral to  or  as needed  Outcome: Progressing  Goal: Establishment of infant feeding pattern  Description: INTERVENTIONS:  - Assess breast/bottle feeding  - Refer to lactation as needed  Outcome: Progressing  Goal: Incision(s), wounds(s) or drain site(s) healing without S/S of infection  Description: INTERVENTIONS  - Assess and document dressing, incision, wound bed, drain sites and surrounding tissue  - Provide patient and family education  Outcome: Progressing     Problem: PAIN - ADULT  Goal: Verbalizes/displays adequate comfort level or baseline comfort level  Description: Interventions:  - Encourage patient to monitor pain and request assistance  - Assess pain using appropriate pain scale  - Administer analgesics based on type and severity of pain and evaluate response  - Implement non-pharmacological measures as appropriate and evaluate response  - Consider cultural and social influences on pain and pain management  - Notify physician/advanced practitioner if interventions unsuccessful or patient reports new pain  Outcome: Progressing     Problem: INFECTION - ADULT  Goal: Absence or prevention of progression during hospitalization  Description: INTERVENTIONS:  - Assess and monitor for signs and symptoms of infection  - Monitor lab/diagnostic results  - Monitor all insertion sites, i e  indwelling lines, tubes, and drains  - Monitor endotracheal if appropriate and nasal secretions for changes in amount and color  - [place] appropriate cooling/warming therapies per order  - Administer medications as ordered  - Instruct and encourage patient and family to use good hand hygiene technique  - Identify and instruct in appropriate isolation precautions for identified infection/condition  Outcome: Progressing     Problem: SAFETY ADULT  Goal: Patient will remain free of falls  Description: INTERVENTIONS:  - Educate patient/family on patient safety including physical limitations  - Instruct patient to call for assistance with activity   - Consult OT/PT to assist with strengthening/mobility   - Keep Call bell within reach  - Keep bed low and locked with side rails adjusted as appropriate  - Keep care items and personal belongings within reach  - Initiate and maintain comfort rounds  - Make Fall Risk Sign visible to staff  - Offer Toileting every  Hours, in advance of need  - Initiate/Maintain alarm  - Obtain necessary fall risk management equipment:   - Apply yellow socks and bracelet for high fall risk patients  - Consider moving patient to room near nurses station  Outcome: Progressing     Problem: Knowledge Deficit  Goal: Patient/family/caregiver demonstrates understanding of disease process, treatment plan, medications, and discharge instructions  Description: Complete learning assessment and assess knowledge base    Interventions:  - Provide teaching at level of understanding  - Provide teaching via preferred learning methods  Outcome: Progressing     Problem: DISCHARGE PLANNING  Goal: Discharge to home or other facility with appropriate resources  Description: INTERVENTIONS:  - Identify barriers to discharge w/patient and caregiver  - Arrange for needed discharge resources and transportation as appropriate  - Identify discharge learning needs (meds, wound care, etc )  - Arrange for interpretive services to assist at discharge as needed  - Refer to Case Management Department for coordinating discharge planning if the patient needs post-hospital services based on physician/advanced practitioner order or complex needs related to functional status, cognitive ability, or social support system  Outcome: Progressing

## 2023-05-02 NOTE — ASSESSMENT & PLAN NOTE
, Hgb 13 6 --> post op Hgb 10 2  S/p James, due for VT x1 this AM  Pain: Tylenol and toradol scheduled, ena 5/10 PRN    FEN: Tolerating regular diet  DVT ppx: SCDs and ambulation   Further discussion with heme/onc regarding medication for anticoagulation  Passing flatus   Incision C/D/I

## 2023-05-02 NOTE — LACTATION NOTE
This note was copied from a baby's chart  Met with mother to discuss feeding plan  Mother is breastfeeding and discussed that she is breastfeeding very well and that baby cluster fed during the night  The Ready, Set, Baby Booklet was briefly discussed to cover important points for mother  Addressed breast pump needs and mother discussed that she has a breast pump for home use  She was made aware of how to communicate with lactation and encouraged to reach out for a latch assessment, continued support and/or questions that arise

## 2023-05-02 NOTE — PLAN OF CARE
Problem: POSTPARTUM  Goal: Experiences normal postpartum course  Description: INTERVENTIONS:  - Monitor maternal vital signs  - Assess uterine involution and lochia  Outcome: Progressing  Goal: Appropriate maternal -  bonding  Description: INTERVENTIONS:  - Identify family support  - Assess for appropriate maternal/infant bonding   -Encourage maternal/infant bonding opportunities  - Referral to  or  as needed  Outcome: Progressing  Goal: Establishment of infant feeding pattern  Description: INTERVENTIONS:  - Assess breast/bottle feeding  - Refer to lactation as needed  Outcome: Progressing  Goal: Incision(s), wounds(s) or drain site(s) healing without S/S of infection  Description: INTERVENTIONS  - Assess and document dressing, incision, wound bed, drain sites and surrounding tissue  - Provide patient and family education  - Perform skin care/dressing changes every   Outcome: Progressing     Problem: PAIN - ADULT  Goal: Verbalizes/displays adequate comfort level or baseline comfort level  Description: Interventions:  - Encourage patient to monitor pain and request assistance  - Assess pain using appropriate pain scale  - Administer analgesics based on type and severity of pain and evaluate response  - Implement non-pharmacological measures as appropriate and evaluate response  - Consider cultural and social influences on pain and pain management  - Notify physician/advanced practitioner if interventions unsuccessful or patient reports new pain  Outcome: Progressing     Problem: INFECTION - ADULT  Goal: Absence or prevention of progression during hospitalization  Description: INTERVENTIONS:  - Assess and monitor for signs and symptoms of infection  - Monitor lab/diagnostic results  - Monitor all insertion sites, i e  indwelling lines, tubes, and drains  - Monitor endotracheal if appropriate and nasal secretions for changes in amount and color  - [place] appropriate cooling/warming therapies per order  - Administer medications as ordered  - Instruct and encourage patient and family to use good hand hygiene technique  - Identify and instruct in appropriate isolation precautions for identified infection/condition  Outcome: Progressing     Problem: SAFETY ADULT  Goal: Patient will remain free of falls  Description: INTERVENTIONS:  - Educate patient/family on patient safety including physical limitations  - Instruct patient to call for assistance with activity   - Consult OT/PT to assist with strengthening/mobility   - Keep Call bell within reach  - Keep bed low and locked with side rails adjusted as appropriate  - Keep care items and personal belongings within reach  - Initiate and maintain comfort rounds  - Make Fall Risk Sign visible to staff  - Offer Toileting every  Hours, in advance of need  - Initiate/Maintain alarm  - Obtain necessary fall risk management equipment:  - Apply yellow socks and bracelet for high fall risk patients  - Consider moving patient to room near nurses station  Outcome: Progressing     Problem: Knowledge Deficit  Goal: Patient/family/caregiver demonstrates understanding of disease process, treatment plan, medications, and discharge instructions  Description: Complete learning assessment and assess knowledge base    Interventions:  - Provide teaching at level of understanding  - Provide teaching via preferred learning methods  Outcome: Progressing     Problem: DISCHARGE PLANNING  Goal: Discharge to home or other facility with appropriate resources  Description: INTERVENTIONS:  - Identify barriers to discharge w/patient and caregiver  - Arrange for needed discharge resources and transportation as appropriate  - Identify discharge learning needs (meds, wound care, etc )  - Arrange for interpretive services to assist at discharge as needed  - Refer to Case Management Department for coordinating discharge planning if the patient needs post-hospital services based on physician/advanced practitioner order or complex needs related to functional status, cognitive ability, or social support system  Outcome: Progressing

## 2023-05-03 VITALS
SYSTOLIC BLOOD PRESSURE: 136 MMHG | HEART RATE: 63 BPM | BODY MASS INDEX: 44.35 KG/M2 | DIASTOLIC BLOOD PRESSURE: 70 MMHG | TEMPERATURE: 97.7 F | RESPIRATION RATE: 18 BRPM | OXYGEN SATURATION: 96 % | WEIGHT: 259.8 LBS | HEIGHT: 64 IN

## 2023-05-03 RX ORDER — IBUPROFEN 600 MG/1
600 TABLET ORAL EVERY 6 HOURS SCHEDULED
Qty: 30 TABLET | Refills: 0 | Status: SHIPPED | OUTPATIENT
Start: 2023-05-03

## 2023-05-03 RX ORDER — OXYCODONE HYDROCHLORIDE 5 MG/1
5 TABLET ORAL EVERY 4 HOURS PRN
Qty: 10 TABLET | Refills: 0 | Status: SHIPPED | OUTPATIENT
Start: 2023-05-03 | End: 2023-05-09

## 2023-05-03 RX ORDER — DOCUSATE SODIUM 100 MG/1
100 CAPSULE, LIQUID FILLED ORAL 2 TIMES DAILY
Refills: 0
Start: 2023-05-03

## 2023-05-03 RX ORDER — ACETAMINOPHEN 325 MG/1
975 TABLET ORAL EVERY 8 HOURS
Refills: 0
Start: 2023-05-03

## 2023-05-03 RX ORDER — ACETAMINOPHEN 325 MG/1
975 TABLET ORAL EVERY 8 HOURS
Status: DISCONTINUED | OUTPATIENT
Start: 2023-05-03 | End: 2023-05-03 | Stop reason: HOSPADM

## 2023-05-03 RX ADMIN — ACETAMINOPHEN 975 MG: 325 TABLET ORAL at 03:01

## 2023-05-03 RX ADMIN — IBUPROFEN 600 MG: 600 TABLET, FILM COATED ORAL at 05:53

## 2023-05-03 RX ADMIN — LABETALOL HYDROCHLORIDE 50 MG: 100 TABLET, FILM COATED ORAL at 08:06

## 2023-05-03 RX ADMIN — DOCUSATE SODIUM 100 MG: 100 CAPSULE, LIQUID FILLED ORAL at 08:06

## 2023-05-03 NOTE — PROGRESS NOTES
Progress Note - OB/GYN  Shell Fontanez 34 y o  female MRN: [id number]  Unit/Bed#: -01 Encounter: [id number]    Assessment and Plan     Shell Fontanez is a patient of: [facility]  She is PPD# 2 s/p  repeat  section, low transverse incision  Recovering well and is stable       Status post bilateral salpingectomy  Assessment & Plan  Request for permanent sterilization  S/p bilateral salpingectomy at time of C/S    Status post repeat low transverse  section  Assessment & Plan  , Hgb 13 6 --> post op Hgb 10 2  S/p James, due for VT x1 this AM  Pain: Tylenol and toradol scheduled, ena 5/10 PRN    FEN: Tolerating regular diet  DVT ppx: SCDs and ambulation  Further discussion with heme/onc regarding medication for anticoagulation  Passing flatus   Incision C/D/I         Diet controlled gestational diabetes mellitus (GDM) in third trimester  Assessment & Plan      Recent Labs     23  0756   POCGLU 84       Blood Sugar Average: Last 72 hrs:    Not requiring insulin    Maternal chronic hypertension, third trimester  Assessment & Plan  Systolic (91QOC), HRF:794 , Min:128 , HUP:941   Diastolic (97SOG), WFQ:32, Min:65, Max:89    Currently managed on Labetalol 50mg BID    Rh negative state in antepartum period, second trimester  Assessment & Plan  RhoGAM panel ordered postpartum  She received RhoGAM at 28 weeks    [facility] disease  Assessment & Plan  -Seeing hematology who recommended treatment is DDAVP 150mcg in each nostril 2 hours prior to surgery  Afterwards, she will have twice weekly labs (CBC, VWF) for 6 weeks due to high risk period of postpartum hemorrhage  Recommend providing Tranexamic acid if bleeding occurred  - received DDAVP prior to C/S  - Heme/Onc messaged on POD#0 regarding anticoagulation with Lovenox  Per protocol, pt would receive Lovenox 40mg BID starting on POD#1  Heme/Onc AP recommended repeat vWF to assist in determining anticoagulation   VWF "activity was collected on POD#0 and is in process  - After discussion with hematology on POD#1, no plan for anticoagulation postoperatively at this time  - Encouraged SCD use and ambulation        Disposition    - Anticipate discharge home on PPD# 2, pending BPs      Subjective/Objective     Chief Complaint: Postpartum State     Subjective:    Marylen Sacramento is PPD/POD#2 s/p  repeat  section, low transverse incision  She has no current complaints  Pain is well controlled  Patient is currently voiding  She is ambulating  Patient is currently passing flatus and has had no bowel movement  She is tolerating PO, and denies nausea or vomitting  Patient denies fever, chills, chest pain, shortness of breath, or calf tenderness  Lochia is normal  She is  Breastfeeding  She is recovering well and is stable         Vitals:   /65 (BP Location: Left arm)   Pulse 76   Temp 97 7 °F (36 5 °C) (Oral)   Resp 18   Ht 5' 4\" (1 626 m)   Wt 118 kg (259 lb 12 8 oz)   LMP 2022 (Exact Date)   SpO2 96%   Breastfeeding Yes   BMI 44 59 kg/m²       Intake/Output Summary (Last 24 hours) at 5/3/2023 0524  Last data filed at 2023 1013  Gross per 24 hour   Intake --   Output 350 ml   Net -350 ml       Invasive Devices     Peripheral Intravenous Line  Duration           Peripheral IV 23 Distal;Left;Upper;Ventral (anterior) Antecubital 1 day                Physical Exam:   GEN: Marylen Sacramento appears well, alert and oriented x 3, pleasant and cooperative   CARDIO: RRR, no murmurs or rubs  RESP:  CTAB, no wheezes or rales  ABDOMEN: soft, no tenderness, no distention, fundus @ u-1, Mepilex dressing in place  EXTREMITIES: SCDs on, non tender, no erythema    Labs:     Hemoglobin   Date Value Ref Range Status   2023 10 9 (L) 11 5 - 15 4 g/dL Final   2023 13 6 11 5 - 15 4 g/dL Final     WBC   Date Value Ref Range Status   2023 10 39 (H) 4 31 - 10 16 Thousand/uL Final   2023 10 28 (H) " 4 31 - 10 16 Thousand/uL Final     Platelets   Date Value Ref Range Status   05/02/2023 196 149 - 390 Thousands/uL Final   05/01/2023 246 149 - 390 Thousands/uL Final     Creatinine   Date Value Ref Range Status   05/01/2023 0 49 (L) 0 60 - 1 30 mg/dL Final     Comment:     Standardized to IDMS reference method   12/09/2022 0 45 (L) 0 60 - 1 30 mg/dL Final     Comment:     Standardized to IDMS reference method     AST   Date Value Ref Range Status   05/01/2023 15 13 - 39 U/L Final   12/09/2022 15 13 - 39 U/L Final     Comment:     Specimen collection should occur prior to Sulfasalazine administration due to the potential for falsely depressed results  ALT   Date Value Ref Range Status   05/01/2023 34 7 - 52 U/L Final     Comment:     Specimen collection should occur prior to Sulfasalazine administration due to the potential for falsely depressed results  12/09/2022 58 (H) 7 - 52 U/L Final     Comment:     Specimen collection should occur prior to Sulfasalazine administration due to the potential for falsely depressed results             Ricardo Moses DO  5/3/2023  5:24 AM

## 2023-05-03 NOTE — LACTATION NOTE
"This note was copied from a baby's chart  Discharge Lactation:  Mom states baby is feeding well at the breast     Enc  Mom to offer both breasts at every feeding    D/c reviewed    Nurse on demand: when baby gives hunger cues; when your breasts feel full, or at least every 3 hours during the day and every 5 hours at night counting from the beginning of one feeding to the beginning of the next; which ever comes first  When sucking and swallowing slow, gently compress the breast to restart flow  If active suck-swallow does not restart, gently remove the baby and offer the other breast; offering up to \"four\" breasts per feeding  Provided education on growth spurts, when to introduce bottles; paced bottle feeding, and non-nutritive suck at the breast  Provided education on Signs of satiation  Encouraged to call lactation to observe a latch prior to discharge for reassurance  Encouraged to call baby and me with any questions and closely monitor output      "

## 2023-05-03 NOTE — PLAN OF CARE
Problem: POSTPARTUM  Goal: Experiences normal postpartum course  Description: INTERVENTIONS:  - Monitor maternal vital signs  - Assess uterine involution and lochia  Outcome: Progressing  Goal: Appropriate maternal -  bonding  Description: INTERVENTIONS:  - Identify family support  - Assess for appropriate maternal/infant bonding   -Encourage maternal/infant bonding opportunities  - Referral to  or  as needed  Outcome: Progressing  Goal: Establishment of infant feeding pattern  Description: INTERVENTIONS:  - Assess breast/bottle feeding  - Refer to lactation as needed  Outcome: Progressing  Goal: Incision(s), wounds(s) or drain site(s) healing without S/S of infection  Description: INTERVENTIONS  - Assess and document dressing, incision, wound bed, drain sites and surrounding tissue  - Provide patient and family education  - Perform skin care/dressing changes every   Outcome: Progressing     Problem: PAIN - ADULT  Goal: Verbalizes/displays adequate comfort level or baseline comfort level  Description: Interventions:  - Encourage patient to monitor pain and request assistance  - Assess pain using appropriate pain scale  - Administer analgesics based on type and severity of pain and evaluate response  - Implement non-pharmacological measures as appropriate and evaluate response  - Consider cultural and social influences on pain and pain management  - Notify physician/advanced practitioner if interventions unsuccessful or patient reports new pain  Outcome: Progressing     Problem: INFECTION - ADULT  Goal: Absence or prevention of progression during hospitalization  Description: INTERVENTIONS:  - Assess and monitor for signs and symptoms of infection  - Monitor lab/diagnostic results  - Monitor all insertion sites, i e  indwelling lines, tubes, and drains  - Monitor endotracheal if appropriate and nasal secretions for changes in amount and color  - [place] appropriate cooling/warming therapies per order  - Administer medications as ordered  - Instruct and encourage patient and family to use good hand hygiene technique  - Identify and instruct in appropriate isolation precautions for identified infection/condition  Outcome: Progressing     Problem: SAFETY ADULT  Goal: Patient will remain free of falls  Description: INTERVENTIONS:  - Educate patient/family on patient safety including physical limitations  - Instruct patient to call for assistance with activity   - Consult OT/PT to assist with strengthening/mobility   - Keep Call bell within reach  - Keep bed low and locked with side rails adjusted as appropriate  - Keep care items and personal belongings within reach  - Initiate and maintain comfort rounds  - Make Fall Risk Sign visible to staff  - Offer Toileting every  Hours, in advance of need  - Initiate/Maintain alarm  - Obtain necessary fall risk management equipment  - Apply yellow socks and bracelet for high fall risk patients  - Consider moving patient to room near nurses station  Outcome: Progressing     Problem: Knowledge Deficit  Goal: Patient/family/caregiver demonstrates understanding of disease process, treatment plan, medications, and discharge instructions  Description: Complete learning assessment and assess knowledge base    Interventions:  - Provide teaching at level of understanding  - Provide teaching via preferred learning methods  Outcome: Progressing     Problem: DISCHARGE PLANNING  Goal: Discharge to home or other facility with appropriate resources  Description: INTERVENTIONS:  - Identify barriers to discharge w/patient and caregiver  - Arrange for needed discharge resources and transportation as appropriate  - Identify discharge learning needs (meds, wound care, etc )  - Arrange for interpretive services to assist at discharge as needed  - Refer to Case Management Department for coordinating discharge planning if the patient needs post-hospital services based on physician/advanced practitioner order or complex needs related to functional status, cognitive ability, or social support system  Outcome: Progressing

## 2023-05-04 LAB — VWF:RCO ACT/NOR PPP PL AGG: 174 % (ref 50–200)

## 2023-05-04 NOTE — UTILIZATION REVIEW
"NOTIFICATION OF INPATIENT ADMISSION   MATERNITY/DELIVERY AUTHORIZATION REQUEST   SERVICING FACILITY:   [facility] - L&D, , NICU  [address] [place]  [address]  Tax ID: [id number]  NPI: [id number]   ATTENDING PROVIDER:  Attending Name and NPI#: [place], [place] [[id number]]  Address: Michael Grossman  [address]  Phone: [phone]   ADMISSION INFORMATION:  Place of Service: Inpatient [address] [facility]  [address]  Place of Service Code: 21  Inpatient Admission Date/Time: 23  5:36 AM  Discharge Date/Time: 5/3/2023 11:16 AM  Admitting Diagnosis Code/Description:  Previous  section complicating pregnancy [X12 440]  Request for sterilization [Z30 2]  Encounter for  delivery without indication [O82]     Mother: Fide Gonzalez 1985 Estimated Date of Delivery: 23  Delivering clinician: [facility]    OB History        3    Para   2    Term   2            AB   1    Living   2       SAB   1    IAB        Ectopic        Multiple   0    Live Births   2               [place] Name & MRN:   Information for the patient's :  Delsie Duverney [[id number]]     [place] Delivery Information:  Sex: male  Delivered 2023 8:46 AM by , Low Transverse; Gestational Age: 36w3d     Measurements:  Weight: 8 lb 0 9 oz (3655 g); Height: 19\"    APGAR 1 minute 5 minutes 10 minutes   Totals: 8 9      [place] Birth Information: 34 y o  female MRN: [id number] Unit/Bed#: -01   Birthweight: No birth weight on file  Gestational Age: <None> Delivery Type:    APGARS Totals:        UTILIZATION REVIEW CONTACT:  Waldo Desai Utilization   Network Utilization Review Department  Phone: [phone]  Fax [phone]  Email: [email or web address]  Contact for approvals/pending authorizations, clinical reviews, and discharge       PHYSICIAN ADVISORY SERVICES:  Medical Necessity Denial & " [employer] Review  Phone: [phone]  Fax: [phone]  Email: [email or web address]

## 2023-05-08 ENCOUNTER — TELEPHONE (OUTPATIENT)
Dept: PERINATAL CARE | Facility: CLINIC | Age: 38
End: 2023-05-08

## 2023-05-08 DIAGNOSIS — Z13.1 DIABETES MELLITUS SCREENING: Primary | ICD-10-CM

## 2023-05-08 DIAGNOSIS — Z86.32 HISTORY OF GESTATIONAL DIABETES MELLITUS, NOT PREGNANT: ICD-10-CM

## 2023-05-08 LAB — PLACENTA IN STORAGE: NORMAL

## 2023-05-09 ENCOUNTER — POSTPARTUM VISIT (OUTPATIENT)
Dept: OBGYN CLINIC | Facility: CLINIC | Age: 38
End: 2023-05-09

## 2023-05-09 VITALS
SYSTOLIC BLOOD PRESSURE: 126 MMHG | BODY MASS INDEX: 42.68 KG/M2 | WEIGHT: 250 LBS | DIASTOLIC BLOOD PRESSURE: 78 MMHG | HEIGHT: 64 IN

## 2023-05-09 DIAGNOSIS — Z48.89 POSTOPERATIVE VISIT: Primary | ICD-10-CM

## 2023-05-09 NOTE — PROGRESS NOTES
"Assessment   Jag Roque was seen today for postpartum care  Diagnoses and all orders for this visit:    Postoperative visit       Doing well postoperatively  Plan    1  Continue current medications  2  Wound care discussed  3  Increase activity as tolerated  4  Anticipated return to work: 8 - 12 weeks post partum  5  Follow up at 6 weeks post delivery for postpartum checkup  Joanne Love is a 34 y o  y o  female who presents approximately one week post discharge following a post  on 2023  She is eating a regular diet without difficulty  Bowel movements are normal  The patient is not having any pain  The following portions of the patient's history were reviewed and updated as appropriate: allergies, current medications, past family history, past medical history, past social history, past surgical history and problem list     Allergies  Patient has no known allergies  Medications    Current Outpatient Medications:   •  acetaminophen (TYLENOL) 325 mg tablet, Take 3 tablets (975 mg total) by mouth every 8 (eight) hours, Disp: , Rfl: 0  •  docusate sodium (COLACE) 100 mg capsule, Take 1 capsule (100 mg total) by mouth 2 (two) times a day, Disp: , Rfl: 0  •  escitalopram (LEXAPRO) 5 mg tablet, Take 5 mg by mouth daily, Disp: , Rfl:   •  ibuprofen (MOTRIN) 600 mg tablet, Take 1 tablet (600 mg total) by mouth every 6 (six) hours, Disp: 30 tablet, Rfl: 0  •  labetalol (NORMODYNE) 100 mg tablet, Take 0 5 tablets (50 mg total) by mouth 2 (two) times a day, Disp: 180 tablet, Rfl: 1  •  Prenatal Vit-DSS-Fe Fum-FA (Prenatal 19) tablet, Take by mouth daily, Disp: , Rfl:       Review of Systems  Denies Fevers/chills/N/V/constipation/ excessive vaginal bleeding/excessive pain/dysuria/frequency of urination  Also as noted in HPI        Objective     /78 (BP Location: Right arm, Patient Position: Sitting, Cuff Size: Large)   Ht 5' 4\" (1 626 m)   Wt 113 kg (250 lb)   LMP " 07/31/2022 (Exact Date)   Breastfeeding Yes   BMI 42 91 kg/m²     General: alert and oriented, in no acute distress  Abdomen: soft, non-tender  Incision: healing well, no drainage, no erythema, no hernia, no seroma, no swelling, no dehiscence, incision well approximated

## 2023-06-14 ENCOUNTER — POSTPARTUM VISIT (OUTPATIENT)
Dept: OBGYN CLINIC | Facility: CLINIC | Age: 38
End: 2023-06-14
Payer: COMMERCIAL

## 2023-06-14 VITALS
DIASTOLIC BLOOD PRESSURE: 84 MMHG | HEIGHT: 64 IN | WEIGHT: 238 LBS | SYSTOLIC BLOOD PRESSURE: 148 MMHG | BODY MASS INDEX: 40.63 KG/M2

## 2023-06-14 PROBLEM — O99.213 MATERNAL MORBID OBESITY IN THIRD TRIMESTER, ANTEPARTUM (HCC): Status: RESOLVED | Noted: 2022-12-23 | Resolved: 2023-06-14

## 2023-06-14 PROBLEM — Z3A.39 39 WEEKS GESTATION OF PREGNANCY: Status: RESOLVED | Noted: 2022-11-01 | Resolved: 2023-06-14

## 2023-06-14 PROBLEM — Z98.891 STATUS POST REPEAT LOW TRANSVERSE CESAREAN SECTION: Status: RESOLVED | Noted: 2023-05-02 | Resolved: 2023-06-14

## 2023-06-14 PROBLEM — O98.512 COVID-19 AFFECTING PREGNANCY IN SECOND TRIMESTER: Status: RESOLVED | Noted: 2023-02-10 | Resolved: 2023-06-14

## 2023-06-14 PROBLEM — O09.523 MULTIGRAVIDA OF ADVANCED MATERNAL AGE IN THIRD TRIMESTER: Status: RESOLVED | Noted: 2022-11-01 | Resolved: 2023-06-14

## 2023-06-14 PROBLEM — U07.1 COVID-19 AFFECTING PREGNANCY IN SECOND TRIMESTER: Status: RESOLVED | Noted: 2023-02-10 | Resolved: 2023-06-14

## 2023-06-14 PROBLEM — Z90.79 STATUS POST BILATERAL SALPINGECTOMY: Status: RESOLVED | Noted: 2023-05-02 | Resolved: 2023-06-14

## 2023-06-14 PROBLEM — Z67.91 RH NEGATIVE STATE IN ANTEPARTUM PERIOD, SECOND TRIMESTER: Status: RESOLVED | Noted: 2022-12-05 | Resolved: 2023-06-14

## 2023-06-14 PROBLEM — O99.119: Status: RESOLVED | Noted: 2023-03-01 | Resolved: 2023-06-14

## 2023-06-14 PROBLEM — O26.892 RH NEGATIVE STATE IN ANTEPARTUM PERIOD, SECOND TRIMESTER: Status: RESOLVED | Noted: 2022-12-05 | Resolved: 2023-06-14

## 2023-06-14 PROBLEM — O10.913 MATERNAL CHRONIC HYPERTENSION, THIRD TRIMESTER: Status: RESOLVED | Noted: 2022-12-23 | Resolved: 2023-06-14

## 2023-06-14 PROBLEM — O34.219 HISTORY OF CESAREAN SECTION COMPLICATING PREGNANCY: Status: RESOLVED | Noted: 2023-03-02 | Resolved: 2023-06-14

## 2023-06-14 PROBLEM — O99.212 OBESITY AFFECTING PREGNANCY IN SECOND TRIMESTER: Status: RESOLVED | Noted: 2022-11-01 | Resolved: 2023-06-14

## 2023-06-14 PROBLEM — E66.01 MATERNAL MORBID OBESITY IN THIRD TRIMESTER, ANTEPARTUM (HCC): Status: RESOLVED | Noted: 2022-12-23 | Resolved: 2023-06-14

## 2023-06-14 PROBLEM — O24.410 DIET CONTROLLED GESTATIONAL DIABETES MELLITUS (GDM) IN THIRD TRIMESTER: Status: RESOLVED | Noted: 2023-03-13 | Resolved: 2023-06-14

## 2023-06-14 NOTE — PROGRESS NOTES
Assessment/Plan     Normal postpartum exam      1  Contraception: salpingectomy  2  Annual exam due in [place]  3  Lactation consult, [address] information discussed  4  Increase activity as tolerated, may resume all normal activity  5  Anticipated return to work: 6 - 12 weeks post partum  6  c HTN - encourage f/u with PCP in 1-2 months  7  GDM - had normal hgbA1c/fasting gluocose end of May, ordered by PCP  Declines 2hr gtt  Subjective   Chief Complaint   Patient presents with   • Postpartum Care     Pt is here for her post partum visit  She delivered via c/s on 23 at [address] [place] [address]-  Pt is doing well  She is breastfeeding and had a tubal at the time of delivery  She did stop her BP medication- at home BPs are low  Miguel A Slater is a 34 y o   female who presents for a postpartum visit  Delivery Date: 23  Type of delivery: repeat  section, low transverse incision  Gestational Age at delivery: 39w1d   Anesthesia: spinal  Laceration: n/a  Gestational Diabetes: yes - had testing with PCP  Pregnancy Complications: chronic HTN and gestational DM  Breast or formula: Breastfeeding    Baby information:  Seeing pediatrician: yes  Complications: none      Bleeding no bleeding  Bowel function: normal  Bladder function: normal  She is not having any pain  BPs have been low at home so she stopped her labetalol  Slightly elevated today but she was rushing to get here/traffic was annoying  Home BPs have been 112-120's  Patient has notbeen sexually active  Desired contraception method is b/l salpingectom done at time of CS  Postpartum depression screening: negative  EPDS : 3; anxiety at times, but nothing debilitating       Last Pap : 2022 ; no abnormalities       The following portions of the patient's history were reviewed and updated as appropriate: allergies, current medications, past family history, past medical history, past social history, past surgical "history and problem list       Current Outpatient Medications:   •  docusate sodium (COLACE) 100 mg capsule, Take 1 capsule (100 mg total) by mouth 2 (two) times a day, Disp: , Rfl: 0  •  escitalopram (LEXAPRO) 5 mg tablet, Take 5 mg by mouth daily, Disp: , Rfl:   •  Prenatal Vit-DSS-Fe Fum-FA (Prenatal 19) tablet, Take by mouth daily, Disp: , Rfl:   •  labetalol (NORMODYNE) 100 mg tablet, Take 0 5 tablets (50 mg total) by mouth 2 (two) times a day (Patient not taking: Reported on 6/14/2023), Disp: 180 tablet, Rfl: 1    No Known Allergies    Review of Systems  Review of Systems   Constitutional: Negative for chills and fever  Respiratory: Negative for shortness of breath  Cardiovascular: Negative for chest pain and leg swelling  Gastrointestinal: Negative for abdominal distention, abdominal pain, constipation, nausea and vomiting  Genitourinary: Negative for dysuria, frequency, urgency, vaginal bleeding and vaginal discharge  Neurological: Negative for headaches  Objective      /84   Ht 5' 4\" (1 626 m)   Wt 108 kg (238 lb)   LMP 07/31/2022 (Exact Date)   Breastfeeding Yes   BMI 40 85 kg/m²     Physical Exam  Constitutional:       General: She is not in acute distress  Appearance: Normal appearance  She is well-developed  She is not ill-appearing  Pulmonary:      Effort: Pulmonary effort is normal  No respiratory distress  Neurological:      General: No focal deficit present  Mental Status: She is alert and oriented to person, place, and time  Psychiatric:         Mood and Affect: Mood normal          Behavior: Behavior normal          Thought Content: Thought content normal          Judgment: Judgment normal    Vitals and nursing note reviewed           Incision: clean, dry, and intact      "

## 2023-06-19 ENCOUNTER — HOSPITAL ENCOUNTER (EMERGENCY)
Facility: HOSPITAL | Age: 38
Discharge: HOME | End: 2023-06-20
Attending: EMERGENCY MEDICINE
Payer: COMMERCIAL

## 2023-06-19 ENCOUNTER — APPOINTMENT (EMERGENCY)
Dept: ULTRASOUND IMAGING | Facility: HOSPITAL | Age: 38
End: 2023-06-19
Payer: COMMERCIAL

## 2023-06-19 DIAGNOSIS — S30.1XXA ABDOMINAL WALL SEROMA, INITIAL ENCOUNTER: ICD-10-CM

## 2023-06-19 DIAGNOSIS — K80.20 CHOLELITHIASIS: ICD-10-CM

## 2023-06-19 DIAGNOSIS — K80.50 BILIARY COLIC: Primary | ICD-10-CM

## 2023-06-19 DIAGNOSIS — R79.89 ELEVATED LIVER FUNCTION TESTS: ICD-10-CM

## 2023-06-19 LAB
ALBUMIN SERPL BCP-MCNC: 4.5 G/DL (ref 3.5–5)
ALP SERPL-CCNC: 169 U/L (ref 34–104)
ALT SERPL W P-5'-P-CCNC: 139 U/L (ref 7–52)
ANION GAP SERPL CALCULATED.3IONS-SCNC: 7 MMOL/L (ref 4–13)
AST SERPL W P-5'-P-CCNC: 158 U/L (ref 13–39)
BASOPHILS # BLD AUTO: 0.08 THOUSANDS/ÂΜL (ref 0–0.1)
BASOPHILS NFR BLD AUTO: 1 % (ref 0–1)
BILIRUB SERPL-MCNC: 0.8 MG/DL (ref 0.2–1)
BUN SERPL-MCNC: 13 MG/DL (ref 5–25)
CALCIUM SERPL-MCNC: 9.9 MG/DL (ref 8.4–10.2)
CHLORIDE SERPL-SCNC: 103 MMOL/L (ref 96–108)
CO2 SERPL-SCNC: 28 MMOL/L (ref 21–32)
CREAT SERPL-MCNC: 0.77 MG/DL (ref 0.6–1.3)
EOSINOPHIL # BLD AUTO: 0.22 THOUSAND/ÂΜL (ref 0–0.61)
EOSINOPHIL NFR BLD AUTO: 2 % (ref 0–6)
ERYTHROCYTE [DISTWIDTH] IN BLOOD BY AUTOMATED COUNT: 11.9 % (ref 11.6–15.1)
GFR SERPL CREATININE-BSD FRML MDRD: 96 ML/MIN/1.73SQ M
GLUCOSE SERPL-MCNC: 98 MG/DL (ref 65–140)
HCG SERPL QL: NEGATIVE
HCT VFR BLD AUTO: 42.7 % (ref 34.8–46.1)
HGB BLD-MCNC: 14.3 G/DL (ref 11.5–15.4)
IMM GRANULOCYTES # BLD AUTO: 0.05 THOUSAND/UL (ref 0–0.2)
IMM GRANULOCYTES NFR BLD AUTO: 1 % (ref 0–2)
INR PPP: 0.92 (ref 0.84–1.19)
LIPASE SERPL-CCNC: 40 U/L (ref 11–82)
LYMPHOCYTES # BLD AUTO: 2.61 THOUSANDS/ÂΜL (ref 0.6–4.47)
LYMPHOCYTES NFR BLD AUTO: 25 % (ref 14–44)
MCH RBC QN AUTO: 30.7 PG (ref 26.8–34.3)
MCHC RBC AUTO-ENTMCNC: 33.5 G/DL (ref 31.4–37.4)
MCV RBC AUTO: 92 FL (ref 82–98)
MONOCYTES # BLD AUTO: 0.73 THOUSAND/ÂΜL (ref 0.17–1.22)
MONOCYTES NFR BLD AUTO: 7 % (ref 4–12)
NEUTROPHILS # BLD AUTO: 6.91 THOUSANDS/ÂΜL (ref 1.85–7.62)
NEUTS SEG NFR BLD AUTO: 64 % (ref 43–75)
NRBC BLD AUTO-RTO: 0 /100 WBCS
PLATELET # BLD AUTO: 282 THOUSANDS/UL (ref 149–390)
PMV BLD AUTO: 8.8 FL (ref 8.9–12.7)
POTASSIUM SERPL-SCNC: 4 MMOL/L (ref 3.5–5.3)
PROT SERPL-MCNC: 7.6 G/DL (ref 6.4–8.4)
PROTHROMBIN TIME: 12.2 SECONDS (ref 11.6–14.5)
RBC # BLD AUTO: 4.66 MILLION/UL (ref 3.81–5.12)
SODIUM SERPL-SCNC: 138 MMOL/L (ref 135–147)
WBC # BLD AUTO: 10.6 THOUSAND/UL (ref 4.31–10.16)

## 2023-06-19 PROCEDURE — 96375 TX/PRO/DX INJ NEW DRUG ADDON: CPT

## 2023-06-19 PROCEDURE — 96361 HYDRATE IV INFUSION ADD-ON: CPT

## 2023-06-19 PROCEDURE — 96374 THER/PROPH/DIAG INJ IV PUSH: CPT

## 2023-06-19 PROCEDURE — 36415 COLL VENOUS BLD VENIPUNCTURE: CPT | Performed by: PHYSICIAN ASSISTANT

## 2023-06-19 PROCEDURE — 85610 PROTHROMBIN TIME: CPT | Performed by: PHYSICIAN ASSISTANT

## 2023-06-19 PROCEDURE — 84703 CHORIONIC GONADOTROPIN ASSAY: CPT | Performed by: PHYSICIAN ASSISTANT

## 2023-06-19 PROCEDURE — 85025 COMPLETE CBC W/AUTO DIFF WBC: CPT | Performed by: PHYSICIAN ASSISTANT

## 2023-06-19 PROCEDURE — 76705 ECHO EXAM OF ABDOMEN: CPT

## 2023-06-19 PROCEDURE — 99284 EMERGENCY DEPT VISIT MOD MDM: CPT

## 2023-06-19 PROCEDURE — 80053 COMPREHEN METABOLIC PANEL: CPT | Performed by: PHYSICIAN ASSISTANT

## 2023-06-19 PROCEDURE — 83690 ASSAY OF LIPASE: CPT | Performed by: PHYSICIAN ASSISTANT

## 2023-06-19 RX ORDER — SODIUM CHLORIDE 9 MG/ML
1000 INJECTION, SOLUTION INTRAVENOUS CONTINUOUS
Status: DISCONTINUED | OUTPATIENT
Start: 2023-06-19 | End: 2023-06-20 | Stop reason: HOSPADM

## 2023-06-19 RX ORDER — MORPHINE SULFATE 4 MG/ML
4 INJECTION, SOLUTION INTRAMUSCULAR; INTRAVENOUS ONCE
Status: COMPLETED | OUTPATIENT
Start: 2023-06-19 | End: 2023-06-19

## 2023-06-19 RX ORDER — METOCLOPRAMIDE HYDROCHLORIDE 5 MG/ML
5 INJECTION INTRAMUSCULAR; INTRAVENOUS ONCE
Status: COMPLETED | OUTPATIENT
Start: 2023-06-19 | End: 2023-06-19

## 2023-06-19 RX ADMIN — SODIUM CHLORIDE 1000 ML/HR: 0.9 INJECTION, SOLUTION INTRAVENOUS at 21:27

## 2023-06-19 RX ADMIN — METOCLOPRAMIDE 5 MG: 5 INJECTION, SOLUTION INTRAMUSCULAR; INTRAVENOUS at 21:39

## 2023-06-19 RX ADMIN — MORPHINE SULFATE 4 MG: 4 INJECTION INTRAVENOUS at 21:26

## 2023-06-20 ENCOUNTER — APPOINTMENT (EMERGENCY)
Dept: CT IMAGING | Facility: HOSPITAL | Age: 38
End: 2023-06-20
Payer: COMMERCIAL

## 2023-06-20 VITALS
HEART RATE: 54 BPM | DIASTOLIC BLOOD PRESSURE: 70 MMHG | OXYGEN SATURATION: 97 % | TEMPERATURE: 98.1 F | RESPIRATION RATE: 17 BRPM | SYSTOLIC BLOOD PRESSURE: 122 MMHG

## 2023-06-20 PROCEDURE — 74177 CT ABD & PELVIS W/CONTRAST: CPT

## 2023-06-20 PROCEDURE — G1004 CDSM NDSC: HCPCS

## 2023-06-20 PROCEDURE — 96376 TX/PRO/DX INJ SAME DRUG ADON: CPT

## 2023-06-20 RX ORDER — METOCLOPRAMIDE HYDROCHLORIDE 5 MG/ML
5 INJECTION INTRAMUSCULAR; INTRAVENOUS ONCE
Status: COMPLETED | OUTPATIENT
Start: 2023-06-20 | End: 2023-06-20

## 2023-06-20 RX ADMIN — METOCLOPRAMIDE 5 MG: 5 INJECTION, SOLUTION INTRAMUSCULAR; INTRAVENOUS at 00:16

## 2023-06-20 RX ADMIN — IOHEXOL 100 ML: 350 INJECTION, SOLUTION INTRAVENOUS at 00:13

## 2023-06-20 NOTE — ED PROVIDER NOTES
History  Chief Complaint   Patient presents with   • Abdominal Pain     Had baby May 1st, started with abdominal pain 2 hours ago non stop  Had US of abdominal 2 weeks ago  Patient presents to the ED with complaints of abdominal pain that has been constant for the past 2 hours, states she has had issues with it recently and had an ultrasound 2 weeks ago for gallbladder which revealed cholelithiasis  She denies any nausea or vomiting, fevers, chills, constipation or diarrhea  She states she had a  on May 1 however denies any pain, erythema, swelling or drainage around incision site  She recently saw her OB/GYN a few days ago for follow up, no issues  Vitals are all stable and she is afebrile  Pain is located in epigastric/RUQ region and currently is 8/10  Prior to Admission Medications   Prescriptions Last Dose Informant Patient Reported? Taking?    Prenatal Vit-DSS-Fe Fum-FA (Prenatal 19) tablet  Self Yes No   Sig: Take by mouth daily   docusate sodium (COLACE) 100 mg capsule  Self No No   Sig: Take 1 capsule (100 mg total) by mouth 2 (two) times a day   escitalopram (LEXAPRO) 5 mg tablet  Self Yes No   Sig: Take 5 mg by mouth daily   labetalol (NORMODYNE) 100 mg tablet  Self No No   Sig: Take 0 5 tablets (50 mg total) by mouth 2 (two) times a day   Patient not taking: Reported on 2023      Facility-Administered Medications: None       Past Medical History:   Diagnosis Date   • Anxiety    • Diabetes mellitus ([facility] [address] )     a1gdm   • Diet controlled gestational diabetes mellitus (GDM) in third trimester 3/13/2023   • Herpes     hasn't had an outbreak in years   • Hypertension    • Maternal chronic hypertension, third trimester 2022   • Status post bilateral salpingectomy 2023    Performed 2023   • Status post repeat low transverse  section 2023   • Varicella     had chicken pox   • Von Willebrand disease ([facility] [address] )        Past Surgical History:   Procedure Laterality Date   •  SECTION     • FL  DELIVERY ONLY N/A 2023    Procedure:  SECTION () REPEAT;  Surgeon: Marcelle Brunson DO;  [facility];  Service: Obstetrics   • FL LIG/TRNSXJ FALOPIAN TUBE  DEL/ABDML SURG Bilateral 2023    Procedure: LIGATION/COAGULATION TUBAL;  Surgeon: Marcelle Brunson DO;  Location: [facility];  Service: Obstetrics   • TONSILLECTOMY     • WISDOM TOOTH EXTRACTION         Family History   Problem Relation Age of Onset   • Breast cancer Mother    • Hypertension Mother    • Hypertension Father    • Asthma Brother    • Asthma Daughter      I have reviewed and agree with the history as documented  E-Cigarette/Vaping   • E-Cigarette Use Current Every Day User      E-Cigarette/Vaping Substances   • Nicotine No    • THC No    • CBD No    • Flavoring No    • Other No    • Unknown No      Social History     Tobacco Use   • Smoking status: Former     Packs/day: 0 25     Types: Cigarettes     Passive exposure: Past   • Smokeless tobacco: Never   • Tobacco comments:     1 cigg a day   Vaping Use   • Vaping Use: Every day   Substance Use Topics   • Alcohol use: Not Currently   • Drug use: Never       Review of Systems   Constitutional: Negative for activity change, appetite change, chills, diaphoresis and fever  HENT: Negative  Eyes: Negative  Respiratory: Negative for cough, chest tightness and shortness of breath  Cardiovascular: Negative for chest pain, palpitations and leg swelling  Gastrointestinal: Positive for abdominal pain  Negative for abdominal distention, blood in stool, constipation, diarrhea, nausea and vomiting  Endocrine: Negative  Genitourinary: Negative for decreased urine volume, difficulty urinating, dysuria, flank pain, frequency, pelvic pain and urgency  Musculoskeletal: Negative  Skin: Negative  Negative for rash and wound  Allergic/Immunologic: Negative  Neurological: Negative  Hematological: Negative  Psychiatric/Behavioral: Negative  Physical Exam  Physical Exam  Constitutional:       General: She is not in acute distress  Appearance: She is well-developed and normal weight  She is not ill-appearing, toxic-appearing or diaphoretic  HENT:      Head: Normocephalic  Mouth/Throat:      Mouth: Mucous membranes are moist       Pharynx: Oropharynx is clear  Eyes:      Extraocular Movements: Extraocular movements intact  Pupils: Pupils are equal, round, and reactive to light  Cardiovascular:      Rate and Rhythm: Normal rate and regular rhythm  Heart sounds: Normal heart sounds  Pulmonary:      Effort: Pulmonary effort is normal  No respiratory distress  Breath sounds: Normal breath sounds  Abdominal:      General: Abdomen is flat  A surgical scar is present  Bowel sounds are normal  There is no distension  Palpations: Abdomen is soft  Tenderness: There is abdominal tenderness in the right upper quadrant and epigastric area  There is no guarding or rebound  Negative signs include Joyce's sign and McBurney's sign  Comments: Incision site from previous  is clean and dry, nontender and without signs of infection including erythema or drainage, no area of fluctance   Skin:     General: Skin is warm and dry  Capillary Refill: Capillary refill takes less than 2 seconds  Coloration: Skin is not jaundiced  Neurological:      General: No focal deficit present  Mental Status: She is alert and oriented to person, place, and time     Psychiatric:         Mood and Affect: Mood normal          Behavior: Behavior normal          Vital Signs  ED Triage Vitals [23]   Temperature Pulse Respirations Blood Pressure SpO2   98 1 °F (36 7 °C) 80 18 144/78 98 %      Temp Source Heart Rate Source Patient Position - Orthostatic VS BP Location FiO2 (%)   Temporal Monitor Sitting Left arm --      Pain Score       7           Vitals:    23 06/19/23 2245 06/20/23 0000 06/20/23 0215   BP: 144/78 126/60 118/62 122/70   Pulse: 80 60 55 (!) 54   Patient Position - Orthostatic VS: Sitting Sitting Sitting Sitting         Visual Acuity      ED Medications  Medications   morphine injection 4 mg (4 mg Intravenous Given 6/19/23 2126)   metoclopramide (REGLAN) injection 5 mg (5 mg Intravenous Given 6/19/23 2139)   metoclopramide (REGLAN) injection 5 mg (5 mg Intravenous Given 6/20/23 0016)   iohexol (OMNIPAQUE) 350 MG/ML injection (SINGLE-DOSE) 100 mL (100 mL Intravenous Given 6/20/23 0013)       Diagnostic Studies  Results Reviewed     Procedure Component Value Units Date/Time    hCG, qualitative pregnancy [[id number]]  (Normal) Collected: 06/19/23 2111    Lab Status: Final result Specimen: Blood from Arm, Right Updated: 06/19/23 2143     Preg, Serum Negative    Protime-INR [[id number]]  (Normal) Collected: 06/19/23 2124    Lab Status: Final result Specimen: Blood from Arm, Right Updated: 06/19/23 2140     Protime 12 2 seconds      INR 0 92    Comprehensive metabolic panel [[id number]]  (Abnormal) Collected: 06/19/23 2111    Lab Status: Final result Specimen: Blood from Arm, Right Updated: 06/19/23 2131     Sodium 138 mmol/L      Potassium 4 0 mmol/L      Chloride 103 mmol/L      CO2 28 mmol/L      ANION GAP 7 mmol/L      BUN 13 mg/dL      Creatinine 0 77 mg/dL      Glucose 98 mg/dL      Calcium 9 9 mg/dL       U/L       U/L      Alkaline Phosphatase 169 U/L      Total Protein 7 6 g/dL      Albumin 4 5 g/dL      Total Bilirubin 0 80 mg/dL      eGFR 96 ml/min/1 73sq m     Narrative:      Dario guidelines for Chronic Kidney Disease (CKD):   •  Stage 1 with normal or high GFR (GFR > 90 mL/min/1 73 square meters)  •  Stage 2 Mild CKD (GFR = 60-89 mL/min/1 73 square meters)  •  Stage 3A Moderate CKD (GFR = 45-59 mL/min/1 73 square meters)  •  Stage 3B Moderate CKD (GFR = 30-44 mL/min/1 73 square meters)  •  Stage 4 Severe CKD (GFR = 15-29 mL/min/1 73 square meters)  •  Stage 5 End Stage CKD (GFR <15 mL/min/1 73 square meters)  Note: GFR calculation is accurate only with a steady state creatinine    Lipase [[id number]]  (Normal) Collected: 06/19/23 2111    Lab Status: Final result Specimen: Blood from Arm, Right Updated: 06/19/23 2131     Lipase 40 u/L     CBC and differential [[id number]]  (Abnormal) Collected: 06/19/23 2111    Lab Status: Final result Specimen: Blood from Arm, Right Updated: 06/19/23 2116     WBC 10 60 Thousand/uL      RBC 4 66 Million/uL      Hemoglobin 14 3 g/dL      Hematocrit 42 7 %      MCV 92 fL      MCH 30 7 pg      MCHC 33 5 g/dL      RDW 11 9 %      MPV 8 8 fL      Platelets 050 Thousands/uL      nRBC 0 /100 WBCs      Neutrophils Relative 64 %      Immat GRANS % 1 %      Lymphocytes Relative 25 %      Monocytes Relative 7 %      Eosinophils Relative 2 %      Basophils Relative 1 %      Neutrophils Absolute 6 91 Thousands/µL      Immature Grans Absolute 0 05 Thousand/uL      Lymphocytes Absolute 2 61 Thousands/µL      Monocytes Absolute 0 73 Thousand/µL      Eosinophils Absolute 0 22 Thousand/µL      Basophils Absolute 0 08 Thousands/µL                  CT abdomen pelvis with contrast   Final Result by Michael Elizabeth MD (06/20 0132)      Cholelithiasis with minimal pericholecystic fatty haziness  No definite sonographic findings of acute cholecystitis on recent ultrasound  Correlate with lab values and consider HIDA scan if clinically warranted  Postsurgical changes in the anterior pelvic wall  There is a small fluid collection with peripheral enhancement for which considerations include postoperative seroma or abscess  Clinical correlation is recommended  The study was marked in [facility] for immediate notification  Workstation performed: [id number]         US right upper quadrant   Final Result by Rudolph Gutierrez DO (06/19 7998)      Cholelithiasis   No discrete sonographic evidence of acute cholecystitis  Consider biliary colic in the appropriate clinical setting  Correlation with the patient's symptoms and laboratory values recommended  Other findings as above  Workstation performed: [id number]                    Procedures  Procedures         ED Course                                             Medical Decision Making  Patient presents to the ED with complaints of abdominal pain that has been constant for the past 2 hours, states she has had issues with it recently and had an ultrasound of gallbladder 2 weeks ago which revealed cholelithiasis, also states her LFTs were elevated  She denies any nausea or vomiting, fevers, chills, constipation or diarrhea  She states she had a  on May 1 however denies any pain, erythema, swelling or drainage around incision site  Vitals are all stable and she is afebrile  Labs obtained- WBC count 10,600 without left shift, LFTs are elevated however tbili is normal  Ultrasound and CT obtained- gallstones noted however no signs of acute cholecystitis  CT also reveals small fluid collection around postsurgical site of anterior pelvis concerning for abscess vs postop seroma  She has no tenderness in this area and there are no signs of infection of incision site on exam, no area of fluctuance  She also recently saw her OBGYN at f/u appt  She was given IV fluids and pain meds and is feeling much better, tolerated PO  Likely biliary colic  She is stable for dc home however she is aware to f/u with PCP in 1-2 days, will need gensurg referral for elective cholecystectomy in the near future  In the meantime, avoid fatty foods  She is aware to also call her OB regarding CT findings, return to ED if she has severe worsening pain, fevers, yellowing of skin or persistent vomiting    Amount and/or Complexity of Data Reviewed  Labs: ordered  Radiology: ordered  Risk  Prescription drug management            Disposition  Final diagnoses:   Biliary colic   Elevated liver function tests   Abdominal wall seroma, initial encounter   Cholelithiasis     Time reflects when diagnosis was documented in both MDM as applicable and the Disposition within this note     Time User Action Codes Description Comment    6/20/2023  2:15 AM Mennie Curls Add [O96 12] Biliary colic     3/88/2014  3:55 AM Mennie Curls Add [R79 89] Elevated liver function tests     6/20/2023  2:15 AM Mennie Curls Add [S30 1XXA] Abdominal wall seroma, initial encounter     6/20/2023  2:16 AM Mennie Curls Add [K80 20] Cholelithiasis       ED Disposition     ED Disposition   Discharge    Condition   Stable    Date/Time   Tue Jun 20, 2023  2:16 AM    Comment   Robin Fagan discharge to home/self care  Follow-up Information     Follow up With Specialties Details Why Contact Info Additional Information      In 1 day  primary care provider     [address] [place] [address] Emergency Department Emergency Medicine  If symptoms worsen [facility] Emergency Department, [address] [place] [address], [place], [place], [address] Surgery In 1 week  [address]  [phone]             Discharge Medication List as of 6/20/2023  2:17 AM      CONTINUE these medications which have NOT CHANGED    Details   docusate sodium (COLACE) 100 mg capsule Take 1 capsule (100 mg total) by mouth 2 (two) times a day, Starting Wed 5/3/2023, No Print      escitalopram (LEXAPRO) 5 mg tablet Take 5 mg by mouth daily, Starting Thu 12/15/2022, Historical Med      labetalol (NORMODYNE) 100 mg tablet Take 0 5 tablets (50 mg total) by mouth 2 (two) times a day, Starting Fri 2/10/2023, No Print      Prenatal Vit-DSS-Fe Fum-FA (Prenatal 19) tablet Take by mouth daily, Historical Med             No discharge procedures on file      PDMP Review       Value Time User    PDMP Reviewed  Yes 5/3/2023  6:56 AM Neena Salvador DO          ED Provider  Electronically Signed by           Leticia Krishna PA-C  06/20/23 1001

## 2023-06-26 ENCOUNTER — CONSULT (OUTPATIENT)
Dept: SURGERY | Facility: CLINIC | Age: 38
End: 2023-06-26
Payer: COMMERCIAL

## 2023-06-26 VITALS
DIASTOLIC BLOOD PRESSURE: 96 MMHG | WEIGHT: 235 LBS | HEART RATE: 71 BPM | HEIGHT: 64 IN | BODY MASS INDEX: 40.12 KG/M2 | SYSTOLIC BLOOD PRESSURE: 141 MMHG

## 2023-06-26 DIAGNOSIS — D68.00 VON WILLEBRANDS DISEASE (HCC): Primary | ICD-10-CM

## 2023-06-26 PROBLEM — K80.10 CHRONIC CHOLECYSTITIS WITH CALCULUS: Status: ACTIVE | Noted: 2023-06-26

## 2023-06-26 PROCEDURE — 99243 OFF/OP CNSLTJ NEW/EST LOW 30: CPT | Performed by: SURGERY

## 2023-06-26 NOTE — PROGRESS NOTES
Assessment/Plan: Patient presents with symptomatic biliary disease  She has intermittent right upper quadrant and epigastric abdominal pains  It is associated with fried or fatty foods  It causes bloating, belching, distention nausea and vomiting  Recent liver function tests were elevated with a normal bilirubin value  Onset is 7 months  I recommend laparoscopic cholecystectomy  Risks and benefits explained  Patient is agreeable  All questions answered  There are no diagnoses linked to this encounter  Subjective:      Patient ID: Lucita Law is a 34 y o  female  Patient presents for gallbladder consult  States she has had episodes of epigastric pain for 7 months  Ultrasound RUQ 6/19/2023    IMPRESSION:  Cholelithiasis  No discrete sonographic evidence of acute cholecystitis  Consider biliary colic in the appropriate clinical setting  Correlation with the patient's symptoms and laboratory values recommended  CT abdomen pelvis 6/19/2023: GALLBLADDER: There are gallstone(s) within the gallbladder  Minimal pericholecystic fatty haziness        Abdominal Pain  The current episode started more than 1 month ago  The problem occurs intermittently  The problem has been gradually worsening  The pain is located in the epigastric region  The quality of the pain is sharp  The abdominal pain radiates to the RUQ, LUQ and back  Associated symptoms include belching, constipation, flatus, nausea and vomiting  The pain is aggravated by eating  The pain is relieved by nothing  She has tried antacids for the symptoms  The treatment provided no relief  Prior diagnostic workup includes ultrasound and CT scan  Her past medical history is significant for gallstones         The following portions of the patient's history were reviewed and updated as appropriate:     She  has a past medical history of Anxiety, Cholelithiasis (12/2022), Clotting disorder ([facility] [address] ), Diabetes mellitus ([facility] [address] ), Diet controlled gestational diabetes mellitus (GDM) in third trimester (2023), Herpes, Hypertension, Maternal chronic hypertension, third trimester (2022), Obesity, PONV (postoperative nausea and vomiting), Status post bilateral salpingectomy (2023), Status post repeat low transverse  section (2023), Varicella, and Suan Jens disease ([facility] )  She  has a past surgical history that includes Tonsillectomy; [place] tooth extraction;  section; pr  delivery only (N/A, 2023); pr lig/trnsxj falopian tube  del/abdml surg (Bilateral, 2023); and Tubal ligation (2023)  Her family history includes Asthma in her brother and daughter; Breast cancer in her mother; Heart disease in her father and mother; Hypertension in her father and mother  She  reports that she has quit smoking  Her smoking use included cigarettes  She has been exposed to tobacco smoke  She has never used smokeless tobacco  She reports that she does not currently use alcohol  She reports that she does not use drugs  Current Outpatient Medications   Medication Sig Dispense Refill   • docusate sodium (COLACE) 100 mg capsule Take 1 capsule (100 mg total) by mouth 2 (two) times a day  0   • escitalopram (LEXAPRO) 5 mg tablet Take 5 mg by mouth daily     • labetalol (NORMODYNE) 100 mg tablet Take 0 5 tablets (50 mg total) by mouth 2 (two) times a day 180 tablet 1   • Prenatal Vit-DSS-Fe Fum-FA (Prenatal 19) tablet Take by mouth daily       No current facility-administered medications for this visit  She has No Known Allergies       Review of Systems   Constitutional: Negative  Negative for activity change  HENT: Negative  Eyes: Negative  Respiratory: Negative  Cardiovascular: Negative  Gastrointestinal: Positive for abdominal distention, abdominal pain, constipation, flatus, nausea and vomiting  Endocrine: Negative  Genitourinary: Negative  Musculoskeletal: Negative  Skin: Negative  "  Allergic/Immunologic: Negative  Neurological: Negative  Psychiatric/Behavioral: Negative for agitation, behavioral problems and confusion  The patient is not nervous/anxious  Objective:      /96   Pulse 71   Ht 5' 4\" (1 626 m)   Wt 107 kg (235 lb)   BMI 40 34 kg/m²          Physical Exam  Constitutional:       Appearance: Normal appearance  She is well-developed  HENT:      Head: Normocephalic and atraumatic  Nose: Nose normal    Eyes:      Extraocular Movements: Extraocular movements intact  Conjunctiva/sclera: Conjunctivae normal    Cardiovascular:      Rate and Rhythm: Normal rate and regular rhythm  Pulmonary:      Effort: Pulmonary effort is normal    Abdominal:      General: Abdomen is flat  Musculoskeletal:      Right lower leg: No edema  Left lower leg: No edema  Skin:     General: Skin is warm and dry  Neurological:      Mental Status: She is alert and oriented to person, place, and time     Psychiatric:         Mood and Affect: Mood normal          "

## 2023-06-27 ENCOUNTER — PREP FOR PROCEDURE (OUTPATIENT)
Dept: SURGERY | Facility: CLINIC | Age: 38
End: 2023-06-27

## 2023-06-27 DIAGNOSIS — K80.10 CHRONIC CALCULOUS CHOLECYSTITIS: Primary | ICD-10-CM

## 2023-06-30 ENCOUNTER — ANESTHESIA EVENT (OUTPATIENT)
Dept: PERIOP | Facility: HOSPITAL | Age: 38
End: 2023-06-30
Payer: COMMERCIAL

## 2023-07-03 NOTE — DISCHARGE INSTR - AVS FIRST PAGE
Please call the office when you leave to schedule an appointment for 2 weeks. Please call [phone]. [address], [address], [place], [address]. Off of [address] between [facility] and [facility]. Activity:    May lift 10 lb as many times as desired the 1st week,       20 lb in 2 weeks,       30 lb in 3 weeks. Walking is encouraged  Normal daily activities including climbing steps are okay  Do not engage in strenuous activity ( sit-ups or crutches) or contact sports for 4-6 weeks post-operatively    Return to Work:   Okay to return to work when you feel well if you desire. Diet:   You may return to your normal healthy diet. Wound Care: Your wound is closed with dissolvable stitches and glue. It is okay to shower. Wash incision gently with soap and water and pat dry. Do not soak incisions in bath water or swim for two weeks. Do not apply any creams or ointments. Pain Medication:   Please take as directed if needed. May use Advil or Motrin in addition. Recall, the pain medicine and anesthesia is associated with constipation. No driving while taking narcotic pain medications. Other: It is normal to developed a “healing ridge” / firm incision after surgery. This is your body making scar tissue. It is a good sign  Constipation is very common after general anesthesia. Please use milk of magnesia as needed in order to help prevent constipation. It is normal to get bruising after surgery. If you have questions after discharge please call the office.     If you have increased pain, fever >101.5, increased drainage, redness or a bad smell at your surgery site, please call us immediately or come directly to the Emergency Room

## 2023-07-05 NOTE — PRE-PROCEDURE INSTRUCTIONS
Pre-Surgery Instructions:   Medication Instructions   • escitalopram (LEXAPRO) 5 mg tablet Take night before surgery   • Prenatal Vit-DSS-Fe Fum-FA (Prenatal 19) tablet Stop taking 7 days prior to surgery. Medication instructions for day surgery reviewed. Please use only a sip of water to take your instructed medications. Avoid all over the counter vitamins, supplements and NSAIDS for one week prior to surgery per anesthesia guidelines. Tylenol is ok to take as needed. You will receive a call one business day prior to surgery with an arrival time and hospital directions. If your surgery is scheduled on a Monday, the hospital will be calling you on the Friday prior to your surgery. If you have not heard from anyone by 8pm, please call the hospital supervisor through the hospital  at [phone]. Do not eat or drink anything after midnight the night before your surgery, including candy, mints, lifesavers, or chewing gum. Do not drink alcohol 24hrs before your surgery. Try not to smoke at least 24hrs before your surgery. Follow the pre surgery showering instructions as listed in the [facility] Surgical Experience Booklet” or otherwise provided by your surgeon's office. Do not shave the surgical area 24 hours before surgery. Do not apply any lotions, creams, including makeup, cologne, deodorant, or perfumes after showering on the day of your surgery. No contact lenses, eye make-up, or artificial eyelashes. Remove nail polish, including gel polish, and any artificial, gel, or acrylic nails if possible. Remove all jewelry including rings and body piercing jewelry. Wear causal clothing that is easy to take on and off. Consider your type of surgery. Keep any valuables, jewelry, piercings at home. Please bring any specially ordered equipment (sling, braces) if indicated. Arrange for a responsible person to drive you to and from the hospital on the day of your surgery.  Visitor Guidelines discussed. Call the surgeon's office with any new illnesses, exposures, or additional questions prior to surgery. Please reference your [facility] Surgical Experience Booklet” for additional information to prepare for your upcoming surgery.

## 2023-07-12 NOTE — ANESTHESIA PREPROCEDURE EVALUATION
Procedure:  CHOLECYSTECTOMY LAPAROSCOPIC (Abdomen)    Relevant Problems   HEMATOLOGY   (+) Von Willebrands disease      NEURO/PSYCH   (+) Generalized anxiety disorder   (+) Panic disorder      Surgical History     Current as of 23 1546  TONSILLECTOMY WISDOM TOOTH EXTRACTION    SECTION WA  DELIVERY ONLY   [place] LIG/TRNSXJ FALOPIAN TUBE  DEL/ABDML SURG TUBAL LIGATION     Substance History     Current as of 23 1546  Smoking Status: Former   Quit Smokin22   Passive Exposure: Past   Smokeless Tobacco Status: Never   Alcohol use: Not Currently   Drug use: Never     Problem List     Current as of 23 1546  Chronic cholecystitis with calculus   Generalized anxiety disorder   Panic disorder   Von Willebrands disease         Note from last surgery    Note 2023 Shemar:  "Nancy Dave has a history of mild vWD diagnosed at age 19 during a workup for menorrhagia. She had a previous c/s under general anesthesia due to her history at an OSH however workup during this pregnancy reveals a very mild form of vWD and she is a candidate for spinal anesthesia for her upcoming c/s. I agree with hematology's recommendations for administration of DDAVP 2 hours prior to delivery and a dose of TXA after delivery. No further workup is required as long as her clinical picture does not change. ".      Physical Exam    Airway    Mallampati score: III  TM Distance: >3 FB  Neck ROM: full     Dental       Cardiovascular  Cardiovascular exam normal    Pulmonary  Pulmonary exam normal     Other Findings        Anesthesia Plan  ASA Score- 2     Anesthesia Type- general with ASA Monitors. Additional Monitors:   Airway Plan: ETT. Plan Factors-Exercise tolerance (METS): >4 METS. Chart reviewed. Existing labs reviewed. Patient summary reviewed. Patient is not a current smoker. Induction- intravenous. Postoperative Plan- Plan for postoperative opioid use.  Planned trial extubation    Informed Consent- Anesthetic plan and risks discussed with patient. I personally reviewed this patient with the CRNA. Discussed and agreed on the Anesthesia Plan with the CRNA. Yari Vilchis

## 2023-07-13 ENCOUNTER — ANESTHESIA (OUTPATIENT)
Dept: PERIOP | Facility: HOSPITAL | Age: 38
End: 2023-07-13
Payer: COMMERCIAL

## 2023-07-13 ENCOUNTER — HOSPITAL ENCOUNTER (OUTPATIENT)
Facility: HOSPITAL | Age: 38
Setting detail: OUTPATIENT SURGERY
Discharge: HOME | End: 2023-07-13
Attending: SURGERY | Admitting: SURGERY
Payer: COMMERCIAL

## 2023-07-13 VITALS
WEIGHT: 233 LBS | DIASTOLIC BLOOD PRESSURE: 67 MMHG | HEIGHT: 64 IN | BODY MASS INDEX: 39.78 KG/M2 | OXYGEN SATURATION: 96 % | SYSTOLIC BLOOD PRESSURE: 139 MMHG | TEMPERATURE: 97.4 F | HEART RATE: 58 BPM | RESPIRATION RATE: 16 BRPM

## 2023-07-13 DIAGNOSIS — K80.10 CHRONIC CALCULOUS CHOLECYSTITIS: ICD-10-CM

## 2023-07-13 DIAGNOSIS — K80.10 CHRONIC CHOLECYSTITIS WITH CALCULUS: Primary | ICD-10-CM

## 2023-07-13 LAB
EXT PREGNANCY TEST URINE: NEGATIVE
EXT. CONTROL: NORMAL

## 2023-07-13 PROCEDURE — 81025 URINE PREGNANCY TEST: CPT | Performed by: SURGERY

## 2023-07-13 PROCEDURE — 47562 LAPAROSCOPIC CHOLECYSTECTOMY: CPT | Performed by: SURGERY

## 2023-07-13 PROCEDURE — 88304 TISSUE EXAM BY PATHOLOGIST: CPT | Performed by: PATHOLOGY

## 2023-07-13 RX ORDER — CEFAZOLIN SODIUM 2 G/50ML
2000 SOLUTION INTRAVENOUS ONCE
Status: COMPLETED | OUTPATIENT
Start: 2023-07-13 | End: 2023-07-13

## 2023-07-13 RX ORDER — SIMETHICONE 80 MG
80 TABLET,CHEWABLE ORAL EVERY 6 HOURS PRN
Status: DISCONTINUED | OUTPATIENT
Start: 2023-07-13 | End: 2023-07-13 | Stop reason: HOSPADM

## 2023-07-13 RX ORDER — FENTANYL CITRATE 50 UG/ML
INJECTION, SOLUTION INTRAMUSCULAR; INTRAVENOUS AS NEEDED
Status: DISCONTINUED | OUTPATIENT
Start: 2023-07-13 | End: 2023-07-13

## 2023-07-13 RX ORDER — DEXMEDETOMIDINE HYDROCHLORIDE 100 UG/ML
INJECTION, SOLUTION INTRAVENOUS AS NEEDED
Status: DISCONTINUED | OUTPATIENT
Start: 2023-07-13 | End: 2023-07-13

## 2023-07-13 RX ORDER — LIDOCAINE HYDROCHLORIDE 10 MG/ML
INJECTION, SOLUTION EPIDURAL; INFILTRATION; INTRACAUDAL; PERINEURAL AS NEEDED
Status: DISCONTINUED | OUTPATIENT
Start: 2023-07-13 | End: 2023-07-13

## 2023-07-13 RX ORDER — PROPOFOL 10 MG/ML
INJECTION, EMULSION INTRAVENOUS AS NEEDED
Status: DISCONTINUED | OUTPATIENT
Start: 2023-07-13 | End: 2023-07-13

## 2023-07-13 RX ORDER — SODIUM CHLORIDE 9 MG/ML
INJECTION, SOLUTION INTRAVENOUS AS NEEDED
Status: DISCONTINUED | OUTPATIENT
Start: 2023-07-13 | End: 2023-07-13 | Stop reason: HOSPADM

## 2023-07-13 RX ORDER — ONDANSETRON 2 MG/ML
4 INJECTION INTRAMUSCULAR; INTRAVENOUS ONCE AS NEEDED
Status: COMPLETED | OUTPATIENT
Start: 2023-07-13 | End: 2023-07-13

## 2023-07-13 RX ORDER — ONDANSETRON 2 MG/ML
INJECTION INTRAMUSCULAR; INTRAVENOUS AS NEEDED
Status: DISCONTINUED | OUTPATIENT
Start: 2023-07-13 | End: 2023-07-13

## 2023-07-13 RX ORDER — DEXAMETHASONE SODIUM PHOSPHATE 10 MG/ML
INJECTION, SOLUTION INTRAMUSCULAR; INTRAVENOUS AS NEEDED
Status: DISCONTINUED | OUTPATIENT
Start: 2023-07-13 | End: 2023-07-13

## 2023-07-13 RX ORDER — ROCURONIUM BROMIDE 10 MG/ML
INJECTION, SOLUTION INTRAVENOUS AS NEEDED
Status: DISCONTINUED | OUTPATIENT
Start: 2023-07-13 | End: 2023-07-13

## 2023-07-13 RX ORDER — DIPHENHYDRAMINE HYDROCHLORIDE 50 MG/ML
12.5 INJECTION INTRAMUSCULAR; INTRAVENOUS ONCE AS NEEDED
Status: DISCONTINUED | OUTPATIENT
Start: 2023-07-13 | End: 2023-07-13 | Stop reason: HOSPADM

## 2023-07-13 RX ORDER — SODIUM CHLORIDE, SODIUM LACTATE, POTASSIUM CHLORIDE, CALCIUM CHLORIDE 600; 310; 30; 20 MG/100ML; MG/100ML; MG/100ML; MG/100ML
125 INJECTION, SOLUTION INTRAVENOUS CONTINUOUS
Status: DISCONTINUED | OUTPATIENT
Start: 2023-07-13 | End: 2023-07-13 | Stop reason: HOSPADM

## 2023-07-13 RX ORDER — FENTANYL CITRATE/PF 50 MCG/ML
25 SYRINGE (ML) INJECTION
Status: DISCONTINUED | OUTPATIENT
Start: 2023-07-13 | End: 2023-07-13 | Stop reason: HOSPADM

## 2023-07-13 RX ORDER — BUPIVACAINE HYDROCHLORIDE AND EPINEPHRINE 2.5; 5 MG/ML; UG/ML
INJECTION, SOLUTION EPIDURAL; INFILTRATION; INTRACAUDAL; PERINEURAL AS NEEDED
Status: DISCONTINUED | OUTPATIENT
Start: 2023-07-13 | End: 2023-07-13 | Stop reason: HOSPADM

## 2023-07-13 RX ORDER — MAGNESIUM HYDROXIDE 1200 MG/15ML
LIQUID ORAL AS NEEDED
Status: DISCONTINUED | OUTPATIENT
Start: 2023-07-13 | End: 2023-07-13 | Stop reason: HOSPADM

## 2023-07-13 RX ORDER — ONDANSETRON 2 MG/ML
4 INJECTION INTRAMUSCULAR; INTRAVENOUS EVERY 4 HOURS PRN
Status: CANCELLED | OUTPATIENT
Start: 2023-07-13

## 2023-07-13 RX ORDER — OXYCODONE HYDROCHLORIDE AND ACETAMINOPHEN 5; 325 MG/1; MG/1
1 TABLET ORAL EVERY 4 HOURS PRN
Qty: 10 TABLET | Refills: 0 | Status: SHIPPED | OUTPATIENT
Start: 2023-07-13

## 2023-07-13 RX ORDER — OXYCODONE HYDROCHLORIDE AND ACETAMINOPHEN 5; 325 MG/1; MG/1
1 TABLET ORAL EVERY 4 HOURS PRN
Status: DISCONTINUED | OUTPATIENT
Start: 2023-07-13 | End: 2023-07-13 | Stop reason: HOSPADM

## 2023-07-13 RX ORDER — ACETAMINOPHEN 325 MG/1
975 TABLET ORAL ONCE
Status: COMPLETED | OUTPATIENT
Start: 2023-07-13 | End: 2023-07-13

## 2023-07-13 RX ORDER — MIDAZOLAM HYDROCHLORIDE 2 MG/2ML
INJECTION, SOLUTION INTRAMUSCULAR; INTRAVENOUS AS NEEDED
Status: DISCONTINUED | OUTPATIENT
Start: 2023-07-13 | End: 2023-07-13

## 2023-07-13 RX ORDER — HYDROMORPHONE HCL/PF 1 MG/ML
0.5 SYRINGE (ML) INJECTION
Status: CANCELLED | OUTPATIENT
Start: 2023-07-13

## 2023-07-13 RX ORDER — OXYCODONE HYDROCHLORIDE AND ACETAMINOPHEN 5; 325 MG/1; MG/1
1 TABLET ORAL EVERY 4 HOURS PRN
Status: CANCELLED | OUTPATIENT
Start: 2023-07-13

## 2023-07-13 RX ORDER — DESMOPRESSIN ACETATE 0.1 MG/1
0.1 TABLET ORAL ONCE
Status: DISCONTINUED | OUTPATIENT
Start: 2023-07-13 | End: 2023-07-13

## 2023-07-13 RX ADMIN — SODIUM CHLORIDE, SODIUM LACTATE, POTASSIUM CHLORIDE, AND CALCIUM CHLORIDE: .6; .31; .03; .02 INJECTION, SOLUTION INTRAVENOUS at 09:22

## 2023-07-13 RX ADMIN — FENTANYL CITRATE 100 MCG: 50 INJECTION, SOLUTION INTRAMUSCULAR; INTRAVENOUS at 09:26

## 2023-07-13 RX ADMIN — LIDOCAINE HYDROCHLORIDE 50 MG: 10 INJECTION, SOLUTION EPIDURAL; INFILTRATION; INTRACAUDAL; PERINEURAL at 09:26

## 2023-07-13 RX ADMIN — FENTANYL CITRATE 25 MCG: 50 INJECTION INTRAMUSCULAR; INTRAVENOUS at 11:03

## 2023-07-13 RX ADMIN — DEXAMETHASONE SODIUM PHOSPHATE 10 MG: 10 INJECTION, SOLUTION INTRAMUSCULAR; INTRAVENOUS at 09:26

## 2023-07-13 RX ADMIN — MIDAZOLAM HYDROCHLORIDE 2 MG: 1 INJECTION, SOLUTION INTRAMUSCULAR; INTRAVENOUS at 09:22

## 2023-07-13 RX ADMIN — FENTANYL CITRATE 50 MCG: 50 INJECTION, SOLUTION INTRAMUSCULAR; INTRAVENOUS at 09:57

## 2023-07-13 RX ADMIN — FENTANYL CITRATE 25 MCG: 50 INJECTION, SOLUTION INTRAMUSCULAR; INTRAVENOUS at 10:27

## 2023-07-13 RX ADMIN — FENTANYL CITRATE 25 MCG: 50 INJECTION INTRAMUSCULAR; INTRAVENOUS at 11:17

## 2023-07-13 RX ADMIN — SUGAMMADEX 200 MG: 100 INJECTION, SOLUTION INTRAVENOUS at 10:34

## 2023-07-13 RX ADMIN — ONDANSETRON 4 MG: 2 INJECTION INTRAMUSCULAR; INTRAVENOUS at 11:01

## 2023-07-13 RX ADMIN — ONDANSETRON 4 MG: 2 INJECTION INTRAMUSCULAR; INTRAVENOUS at 09:26

## 2023-07-13 RX ADMIN — OXYCODONE HYDROCHLORIDE AND ACETAMINOPHEN 1 TABLET: 5; 325 TABLET ORAL at 12:26

## 2023-07-13 RX ADMIN — PROPOFOL 200 MG: 10 INJECTION, EMULSION INTRAVENOUS at 09:26

## 2023-07-13 RX ADMIN — DEXMEDETOMIDINE HYDROCHLORIDE 12 MCG: 100 INJECTION, SOLUTION INTRAVENOUS at 09:32

## 2023-07-13 RX ADMIN — ACETAMINOPHEN 975 MG: 325 TABLET, FILM COATED ORAL at 08:02

## 2023-07-13 RX ADMIN — CEFAZOLIN SODIUM 2000 MG: 2 SOLUTION INTRAVENOUS at 09:22

## 2023-07-13 RX ADMIN — FENTANYL CITRATE 25 MCG: 50 INJECTION INTRAMUSCULAR; INTRAVENOUS at 11:22

## 2023-07-13 RX ADMIN — ROCURONIUM BROMIDE 50 MG: 10 SOLUTION INTRAVENOUS at 09:26

## 2023-07-13 RX ADMIN — DESMOPRESSIN ACETATE 32 MCG: 4 INJECTION, SOLUTION INTRAVENOUS; SUBCUTANEOUS at 08:34

## 2023-07-13 RX ADMIN — FENTANYL CITRATE 25 MCG: 50 INJECTION INTRAMUSCULAR; INTRAVENOUS at 11:08

## 2023-07-13 RX ADMIN — FENTANYL CITRATE 25 MCG: 50 INJECTION, SOLUTION INTRAMUSCULAR; INTRAVENOUS at 10:08

## 2023-07-13 RX ADMIN — SIMETHICONE 80 MG: 80 TABLET, CHEWABLE ORAL at 13:04

## 2023-07-13 RX ADMIN — DEXMEDETOMIDINE HYDROCHLORIDE 8 MCG: 100 INJECTION, SOLUTION INTRAVENOUS at 09:50

## 2023-07-13 NOTE — OP NOTE
OPERATIVE REPORT  PATIENT NAME: Felicia Arora    :  1985  MRN: [id number]  Pt Location: [facility] OR ROOM 01    SURGERY DATE: 2023    Surgeon(s) and Role:     * Jose L Bowman MD - Primary     * Bianca Wesley MD - Assisting    Preop Diagnosis:  Chronic calculous cholecystitis [K80.10]    Post-Op Diagnosis Codes:     * Chronic calculous cholecystitis [K80.10]    Procedure(s):  CHOLECYSTECTOMY LAPAROSCOPIC    Specimen(s):  ID Type Source Tests Collected by Time Destination   1 :  Tissue Gallbladder TISSUE EXAM Jose L Bowman MD 2023  9:56 AM        Estimated Blood Loss:   Minimal    Drains:  * No LDAs found *    Anesthesia Type:   General    Operative Indications:  Chronic calculous cholecystitis [K80.10]    Independent, non-smoker, ASA 2, wound class II, BMI 40, weight 240, height 64   Von Willebrands disease       NEURO/PSYCH   (+) Generalized anxiety disorder   (+) Panic disorder           Complications:   None    Procedure and Technique:  Felicia Arora is a 34 y.o. female was brought into the operative suite and identified visually and by arm band. The patient was placed in the supine position. Careful attention towards positioning of extremities was completed. After sterile prep and drape a timeout was completed. Athrombic pumps in place. Antibiotics provided. After instillation of local analgesia an incision was made at the umbilicus . With blunt dissection the peritoneum was identified. This was pulled upward using Kocher clamps. An incision was made. Hemostat was used to bluntly puncture the peritoneum. Intra-abdominal location was verified. A trocar was then inserted. CO2 was then insufflated with a back pressure of 15. After Marcaine instillation at each additional site, additional trochars are placed under direct vision into the upper aspect of the abdomen. Laparoscopic visualization revealed a normal liver and normal stomach. No excess peritoneal fluid. The gallbladder was pulled with traction inferiorly, and the liver was pushed superior. A dome down technique was completed using electrocautery. This technique carried down to the level of Mendez's pouch. Careful attention was made towards location of the right hepatic artery, cystic artery, common bile duct, right hepatic duct and the cystic duct. Careful attention was made towards the critical anatomy at this region. The cystic duct was identified inserting into the base of the gallbladder. Cystic artery was identified also inserting into the base of the gallbladder. The cystic duct was then clipped ×3 and divided. The cystic artery was clipped ×3 and divided. The gallbladder was then removed from the liver bed with additional electrocautery. The area was copiously irrigated. Hemostasis was assured. The gallbladder was placed into an Endo-Catch bag, and was then removed through the umbilical incision. Fascia was closed with 0 Vicryl suture. The subcutaneous components were irrigated. The subcuticular incisions were then closed. Histacryl was then applied. The patient was awakened from general anesthesia and transferred to the recovery room in stable condition. Sponge and instrument count correct ×2. A postoperative deep briefing was completed. I was present for the entire procedure.     Patient Disposition:  PACU         SIGNATURE: Amadou Graham MD  DATE: July 13, 2023  TIME: 11:16 AM

## 2023-07-13 NOTE — ANESTHESIA POSTPROCEDURE EVALUATION
Post-Op Assessment Note    CV Status:  Stable  Pain Score: 2    Pain management: adequate     Mental Status:  Alert and awake   Hydration Status:  Euvolemic   PONV Controlled:  Controlled   Airway Patency:  Patent      Post Op Vitals Reviewed: Yes      Staff: Anesthesiologist         No notable events documented.     BP   144/ 71   Temp  97.9   Pulse  78   Resp   12   SpO2   97

## 2023-07-13 NOTE — INTERVAL H&P NOTE
H&P reviewed. After examining the patient I find no changes in the patients condition since the H&P had been written.     Vitals:    07/13/23 0753   BP: 128/69   Pulse: 82   Resp: 18   Temp: (!) 97 °F (36.1 °C)   SpO2: 97%

## 2023-07-17 PROCEDURE — 88304 TISSUE EXAM BY PATHOLOGIST: CPT | Performed by: PATHOLOGY

## 2023-07-31 ENCOUNTER — OFFICE VISIT (OUTPATIENT)
Dept: SURGERY | Facility: CLINIC | Age: 38
End: 2023-07-31

## 2023-07-31 DIAGNOSIS — K80.10 CHRONIC CHOLECYSTITIS WITH CALCULUS: Primary | ICD-10-CM

## 2023-07-31 PROCEDURE — 99024 POSTOP FOLLOW-UP VISIT: CPT | Performed by: SURGERY

## 2023-07-31 NOTE — PROGRESS NOTES
Assessment/Plan: Patient is status post laparoscopic cholecystectomy. Overall she feels well. She has no complaints. She is advance her activities nicely. All questions answered. There are no diagnoses linked to this encounter. Pathology: Reviewed with patient, all questions answered. Postoperative restrictions reviewed. All questions answered. ______________________________________________________  HPI: Patient presents post operatively. Laparoscopic cholecystectomy 7/13/2023   Final Diagnosis  A. Gallbladder (cholecystectomy):  - Cholesterolosis  - Chronic cholecystitis   - Negative for dysplasia                  ROS:  General ROS: negative for - chills, fatigue, fever or night sweats, weight loss  Respiratory ROS: no cough, shortness of breath, or wheezing  Cardiovascular ROS: no chest pain or dyspnea on exertion  Genito-Urinary ROS: no dysuria, trouble voiding, or hematuria  Musculoskeletal ROS: negative for - gait disturbance, joint pain or muscle pain  Neurological ROS: no TIA or stroke symptoms  GI ROS: see HPI  Skin ROS: no new rashes or lesions   Lymphatic ROS: no new adenopathy noted by pt. GYN ROS: see HPI, no new GYN history or bleeding noted  Psy ROS: no new mental or behavioral disturbances         Patient Active Problem List   Diagnosis   • Generalized anxiety disorder   • Panic disorder   • Von Willebrands disease   • Chronic cholecystitis with calculus       Allergies:  Patient has no known allergies.       Current Outpatient Medications:   •  escitalopram (LEXAPRO) 5 mg tablet, Take 5 mg by mouth daily, Disp: , Rfl:   •  oxyCODONE-acetaminophen (PERCOCET) 5-325 mg per tablet, Take 1 tablet by mouth every 4 (four) hours as needed for moderate pain for up to 10 doses Max Daily Amount: 6 tablets, Disp: 10 tablet, Rfl: 0  •  Prenatal Vit-DSS-Fe Fum-FA (Prenatal 19) tablet, Take by mouth daily, Disp: , Rfl:     Past Medical History:   Diagnosis Date   • Anxiety    • Cholelithiasis 2022   • Clotting disorder ([address])     Susan Post   • Diabetes mellitus ([address])     [id number]   • Diet controlled gestational diabetes mellitus (GDM) in third trimester 2023   • Herpes     hasn't had an outbreak in years   • Hypertension     pt states during pregnancy only, has resolved since birth of infant   • Maternal chronic hypertension, third trimester 2022   • Obesity    • PONV (postoperative nausea and vomiting)    • Status post bilateral salpingectomy 2023    Performed 2023   • Status post repeat low transverse  section 2023   • Varicella     had chicken pox   • Von Willebrand disease ([address])        Past Surgical History:   Procedure Laterality Date   •  SECTION     • [place]  DELIVERY ONLY N/A 2023    Procedure:  SECTION () REPEAT;  Surgeon: Rojelio Ernst DO;  Location: [facility] LD;  Service: Obstetrics   • UT LAPAROSCOPY SURG CHOLECYSTECTOMY N/A 2023    Procedure: CHOLECYSTECTOMY LAPAROSCOPIC;  Surgeon: Tito Ibanez MD;  Location: [facility];  Service: General   • UT LIG/TRNSXJ FALOPIAN TUBE  DEL/ABDML SURG Bilateral 2023    Procedure: LIGATION/COAGULATION TUBAL;  Surgeon: Rojelio Ernst DO;  Location: [facility] LD;  Service: Obstetrics   • TONSILLECTOMY     • TUBAL LIGATION  2023   • WISDOM TOOTH EXTRACTION         Family History   Problem Relation Age of Onset   • Breast cancer Mother    • Hypertension Mother    • Heart disease Mother    • Hypertension Father    • Heart disease Father    • Asthma Brother    • Asthma Daughter         reports that she quit smoking about 7 months ago. Her smoking use included cigarettes. She has been exposed to tobacco smoke. She has never used smokeless tobacco. She reports that she does not currently use alcohol. She reports that she does not use drugs. PHYSICAL EXAM    There were no vitals taken for this visit.     General: normal, cooperative, no distress  Abdominal: soft, nondistended or nontender  Incision: clean, dry, and intact and healing well      Bruce Blunt MD    Date: 7/31/2023 Time: 3:12 PM

## 2023-11-07 NOTE — RESULT ENCOUNTER NOTE
Please let the patient know that there were no significant abnormalities on their echo  We can discuss further at their next appointment  Thanks  Detail Level: Detailed

## 2023-11-24 ENCOUNTER — TELEPHONE (OUTPATIENT)
Dept: HEMATOLOGY ONCOLOGY | Facility: CLINIC | Age: 38
End: 2023-11-24

## 2023-11-24 NOTE — TELEPHONE ENCOUNTER
ALVARADO  DR lida HERRERA   Who are you speaking with? Patient   If it is not the patient, are they listed on an active communication consent form? N/A   Is this a ALVARADO or DR lida HERRERA ALVARADO   Which provider is patient currently scheduled or established with? Isabel Mendez   What is the original appointment date and time? 01/04/24 10AM   At which location is the appointment scheduled to take place? [facility]   Which provider is the patient transitioning care to? Dr. Catherine Lopez   What is the new appointment date and time? 01/25/24 10:20AM   At which location is the new appointment scheduled to take place? [facility]   What is the reason for this change?  Provider

## 2024-01-05 ENCOUNTER — TELEPHONE (OUTPATIENT)
Dept: HEMATOLOGY ONCOLOGY | Facility: CLINIC | Age: 39
End: 2024-01-05

## 2024-01-05 NOTE — TELEPHONE ENCOUNTER
Lvm informing patient that there was an early appt available on 1/25 (8:30am), patient given number to call back to let us know if she can take that appt

## 2024-01-08 ENCOUNTER — TELEPHONE (OUTPATIENT)
Dept: HEMATOLOGY ONCOLOGY | Facility: CLINIC | Age: 39
End: 2024-01-08

## 2024-06-28 DIAGNOSIS — Z00.6 ENCOUNTER FOR EXAMINATION FOR NORMAL COMPARISON OR CONTROL IN CLINICAL RESEARCH PROGRAM: ICD-10-CM

## (undated) DEVICE — UNDYED BRAIDED (POLYGLACTIN 910), SYNTHETIC ABSORBABLE SUTURE: Brand: COATED VICRYL

## (undated) DEVICE — DRESSING MEPILEX AG BORDER 4 X 12 IN

## (undated) DEVICE — PACK PBDS LAP CHOLE RF

## (undated) DEVICE — CHLORAPREP HI-LITE 26ML ORANGE

## (undated) DEVICE — SKIN MARKER DUAL TIP WITH RULER CAP, FLEXIBLE RULER AND LABELS: Brand: DEVON

## (undated) DEVICE — SUT PLAIN 3-0 CT-1 27 IN 842H

## (undated) DEVICE — PAD GROUNDING ADULT

## (undated) DEVICE — ELECTRODE LAP J HOOK SPLIT STEM E-Z CLEAN 33CM -0021S

## (undated) DEVICE — Device

## (undated) DEVICE — DRAPE EQUIPMENT RF WAND

## (undated) DEVICE — ENSEAL 20 CM SHAFT, LARGE JAW: Brand: ENSEAL X1

## (undated) DEVICE — 3M™ STERI-STRIP™ REINFORCED ADHESIVE SKIN CLOSURES, R1547, 1/2 IN X 4 IN (12 MM X 100 MM), 6 STRIPS/ENVELOPE: Brand: 3M™ STERI-STRIP™

## (undated) DEVICE — LAPAROSCOPIC SMOKE EVAC TUBING

## (undated) DEVICE — TROCAR: Brand: KII FIOS FIRST ENTRY

## (undated) DEVICE — ADHESIVE SKIN HIGH VISCOSITY EXOFIN 1ML

## (undated) DEVICE — TROCAR APPPLE 5MM EXTENDED LENGTH

## (undated) DEVICE — LIGAMAX 5 MM ENDOSCOPIC MULTIPLE CLIP APPLIER: Brand: LIGAMAX

## (undated) DEVICE — PACK C-SECTION PBDS

## (undated) DEVICE — NEEDLE 22 G X 1 1/2 SAFETY

## (undated) DEVICE — SUT STRATAFIX SPIRAL 4-0 PGA/PCL 30 X 30 CM SXMD2B409

## (undated) DEVICE — SUT STRATAFIX SPIRAL PDS PLUS 1 CTX 18 IN SXPP1A400

## (undated) DEVICE — STERILE SURGICAL LUBRICANT,  TUBE: Brand: SURGILUBE

## (undated) DEVICE — TISSUE RETRIEVAL SYSTEM: Brand: INZII RETRIEVAL SYSTEM

## (undated) DEVICE — SUT MONOCRYL 4-0 PS-2 27 IN Y426H

## (undated) DEVICE — INTENDED FOR TISSUE SEPARATION, AND OTHER PROCEDURES THAT REQUIRE A SHARP SURGICAL BLADE TO PUNCTURE OR CUT.: Brand: BARD-PARKER SAFETY BLADES SIZE 11, STERILE

## (undated) DEVICE — GLOVE SRG BIOGEL ECLIPSE 7

## (undated) DEVICE — DECANTER: Brand: UNBRANDED

## (undated) DEVICE — GLOVE INDICATOR PI UNDERGLOVE SZ 7.5 BLUE

## (undated) DEVICE — SUT MONOCRYL 0 CTX 36 IN Y398H

## (undated) DEVICE — INSUFLATION TUBING INSUFLOW (LEXION)

## (undated) DEVICE — TOWEL SURG XR DETECT GREEN STRL RFD